# Patient Record
Sex: MALE | Race: WHITE | Employment: FULL TIME | URBAN - METROPOLITAN AREA
[De-identification: names, ages, dates, MRNs, and addresses within clinical notes are randomized per-mention and may not be internally consistent; named-entity substitution may affect disease eponyms.]

---

## 2018-07-26 ENCOUNTER — HOSPITAL ENCOUNTER (EMERGENCY)
Age: 58
Discharge: HOME | End: 2018-07-26
Attending: EMERGENCY MEDICINE
Payer: COMMERCIAL

## 2018-07-26 ENCOUNTER — APPOINTMENT (OUTPATIENT)
Dept: GENERAL RADIOLOGY | Age: 58
End: 2018-07-26
Payer: COMMERCIAL

## 2018-07-26 VITALS
BODY MASS INDEX: 31.14 KG/M2 | HEIGHT: 73 IN | RESPIRATION RATE: 16 BRPM | SYSTOLIC BLOOD PRESSURE: 179 MMHG | DIASTOLIC BLOOD PRESSURE: 106 MMHG | TEMPERATURE: 98.9 F | OXYGEN SATURATION: 98 % | WEIGHT: 235 LBS | HEART RATE: 70 BPM

## 2018-07-26 DIAGNOSIS — S62.521B OPEN FRACTURE OF TUFT OF DISTAL PHALANX OF RIGHT THUMB: Primary | ICD-10-CM

## 2018-07-26 DIAGNOSIS — I10 HYPERTENSION, UNSPECIFIED TYPE: ICD-10-CM

## 2018-07-26 DIAGNOSIS — S61.011A LACERATION OF RIGHT THUMB WITHOUT FOREIGN BODY WITHOUT DAMAGE TO NAIL, INITIAL ENCOUNTER: ICD-10-CM

## 2018-07-26 PROCEDURE — 90471 IMMUNIZATION ADMIN: CPT | Performed by: NURSE PRACTITIONER

## 2018-07-26 PROCEDURE — 73120 X-RAY EXAM OF HAND: CPT

## 2018-07-26 PROCEDURE — 6360000002 HC RX W HCPCS: Performed by: NURSE PRACTITIONER

## 2018-07-26 PROCEDURE — 12001 RPR S/N/AX/GEN/TRNK 2.5CM/<: CPT

## 2018-07-26 PROCEDURE — 6370000000 HC RX 637 (ALT 250 FOR IP): Performed by: NURSE PRACTITIONER

## 2018-07-26 PROCEDURE — 99282 EMERGENCY DEPT VISIT SF MDM: CPT

## 2018-07-26 PROCEDURE — 90715 TDAP VACCINE 7 YRS/> IM: CPT | Performed by: NURSE PRACTITIONER

## 2018-07-26 PROCEDURE — 2500000003 HC RX 250 WO HCPCS: Performed by: NURSE PRACTITIONER

## 2018-07-26 RX ORDER — IBUPROFEN 800 MG/1
800 TABLET ORAL ONCE
Status: COMPLETED | OUTPATIENT
Start: 2018-07-26 | End: 2018-07-26

## 2018-07-26 RX ORDER — HYDROCHLOROTHIAZIDE 12.5 MG/1
12.5 CAPSULE, GELATIN COATED ORAL DAILY
Qty: 14 CAPSULE | Refills: 0 | Status: SHIPPED | OUTPATIENT
Start: 2018-07-26 | End: 2021-06-10

## 2018-07-26 RX ORDER — LIDOCAINE HYDROCHLORIDE 10 MG/ML
5 INJECTION, SOLUTION EPIDURAL; INFILTRATION; INTRACAUDAL; PERINEURAL ONCE
Status: COMPLETED | OUTPATIENT
Start: 2018-07-26 | End: 2018-07-26

## 2018-07-26 RX ORDER — ACETAMINOPHEN AND CODEINE PHOSPHATE 300; 30 MG/1; MG/1
1 TABLET ORAL EVERY 6 HOURS PRN
Qty: 12 TABLET | Refills: 0 | Status: SHIPPED | OUTPATIENT
Start: 2018-07-26 | End: 2018-08-07

## 2018-07-26 RX ORDER — CEPHALEXIN 500 MG/1
500 CAPSULE ORAL 3 TIMES DAILY
Qty: 21 CAPSULE | Refills: 0 | Status: SHIPPED | OUTPATIENT
Start: 2018-07-26 | End: 2018-08-02

## 2018-07-26 RX ADMIN — BACITRACIN ZINC: 500 OINTMENT TOPICAL at 19:36

## 2018-07-26 RX ADMIN — IBUPROFEN 800 MG: 800 TABLET, FILM COATED ORAL at 19:36

## 2018-07-26 RX ADMIN — LIDOCAINE HYDROCHLORIDE 5 ML: 10 INJECTION, SOLUTION EPIDURAL; INFILTRATION; INTRACAUDAL; PERINEURAL at 19:38

## 2018-07-26 RX ADMIN — TETANUS TOXOID, REDUCED DIPHTHERIA TOXOID AND ACELLULAR PERTUSSIS VACCINE, ADSORBED 0.5 ML: 5; 2.5; 8; 8; 2.5 SUSPENSION INTRAMUSCULAR at 19:36

## 2018-07-26 ASSESSMENT — PAIN DESCRIPTION - LOCATION: LOCATION: FINGER (COMMENT WHICH ONE)

## 2018-07-26 ASSESSMENT — PAIN SCALES - GENERAL
PAINLEVEL_OUTOF10: 4
PAINLEVEL_OUTOF10: 5

## 2018-07-26 ASSESSMENT — PAIN DESCRIPTION - PAIN TYPE: TYPE: ACUTE PAIN

## 2018-07-26 ASSESSMENT — PAIN DESCRIPTION - ORIENTATION: ORIENTATION: RIGHT

## 2018-07-26 NOTE — ED PROVIDER NOTES
ED Attending  CC: No       Department of Emergency Medicine   ED  Provider Note  Admit Date/RoomTime: 7/26/2018  5:18 PM  ED Room: 28/33   Chief Complaint   Laceration (rt thumb)    History of Present Illness   Source of history provided by:  patient. History/Exam Limitations: none. Paolo Ayala is a 60 y.o. old male presenting to the emergency department by private vehicle, for Right Thumb pain and laceration which occured 1 hour(s) prior to arrival.  The complaint occurred as a result of Patient states that a piece of metal fell onto his right distal thumb while at work. Previous injury: no.  Since onset the symptoms have been moderate in degree. His pain is aggraveated by movement, use and palpation and relieved by ice and OTC NSAIDS. Tetanus Status: more than 5 years ago. He is right handed. Patient denies any other injury or trauma to his hand or fingers. Patient states that he still has equal sensation to his thumb and all the digits on his right hand. Patient states that he still has full range of motion of his thumb. Patient has a laceration noted to the volar aspect of the distal 1st digit. There is no active bleeding. ROS    Pertinent positives and negatives are stated within HPI, all other systems reviewed and are negative. History reviewed. No pertinent surgical history. Social History:  reports that he has quit smoking. He has never used smokeless tobacco. He reports that he drinks alcohol. Family History: family history is not on file. Allergies: Patient has no known allergies. Physical Exam           ED Triage Vitals [07/26/18 1715]   BP Temp Temp src Pulse Resp SpO2 Height Weight   (!) 181/105 98.9 °F (37.2 °C) -- 87 16 97 % 6' 1\" (1.854 m) 235 lb (106.6 kg)     Oxygen Saturation Interpretation: Normal.    Constitutional:  Alert, development consistent with age. NAD  Neck:  Normal ROM. Supple. Non-tender. No midline or paraspinal tenderness.  No step-offs or

## 2019-08-16 ENCOUNTER — HOSPITAL ENCOUNTER (EMERGENCY)
Age: 59
Discharge: HOME | End: 2019-08-16
Attending: EMERGENCY MEDICINE
Payer: COMMERCIAL

## 2019-08-16 ENCOUNTER — APPOINTMENT (OUTPATIENT)
Dept: GENERAL RADIOLOGY | Age: 59
End: 2019-08-16
Payer: COMMERCIAL

## 2019-08-16 VITALS
SYSTOLIC BLOOD PRESSURE: 162 MMHG | HEIGHT: 73 IN | TEMPERATURE: 97.4 F | DIASTOLIC BLOOD PRESSURE: 89 MMHG | BODY MASS INDEX: 32.47 KG/M2 | WEIGHT: 245 LBS | OXYGEN SATURATION: 98 % | HEART RATE: 82 BPM | RESPIRATION RATE: 18 BRPM

## 2019-08-16 DIAGNOSIS — S68.119A TRAUMATIC AMPUTATION OF FINGER, INITIAL ENCOUNTER: Primary | ICD-10-CM

## 2019-08-16 PROCEDURE — 99284 EMERGENCY DEPT VISIT MOD MDM: CPT

## 2019-08-16 PROCEDURE — 96365 THER/PROPH/DIAG IV INF INIT: CPT

## 2019-08-16 PROCEDURE — 90715 TDAP VACCINE 7 YRS/> IM: CPT | Performed by: EMERGENCY MEDICINE

## 2019-08-16 PROCEDURE — 6360000002 HC RX W HCPCS: Performed by: EMERGENCY MEDICINE

## 2019-08-16 PROCEDURE — 90471 IMMUNIZATION ADMIN: CPT | Performed by: EMERGENCY MEDICINE

## 2019-08-16 PROCEDURE — 73130 X-RAY EXAM OF HAND: CPT

## 2019-08-16 RX ORDER — FENTANYL CITRATE 50 UG/ML
100 INJECTION, SOLUTION INTRAMUSCULAR; INTRAVENOUS ONCE
Status: COMPLETED | OUTPATIENT
Start: 2019-08-16 | End: 2019-08-16

## 2019-08-16 RX ORDER — CEFAZOLIN SODIUM 1 G/50ML
1 SOLUTION INTRAVENOUS ONCE
Status: COMPLETED | OUTPATIENT
Start: 2019-08-16 | End: 2019-08-16

## 2019-08-16 RX ORDER — LIDOCAINE HYDROCHLORIDE 10 MG/ML
20 INJECTION, SOLUTION EPIDURAL; INFILTRATION; INTRACAUDAL; PERINEURAL SEE ADMIN INSTRUCTIONS
Status: DISCONTINUED | OUTPATIENT
Start: 2019-08-16 | End: 2019-08-16 | Stop reason: HOSPADM

## 2019-08-16 RX ORDER — CEPHALEXIN 500 MG/1
500 CAPSULE ORAL 3 TIMES DAILY
Qty: 30 CAPSULE | Refills: 0 | Status: SHIPPED | OUTPATIENT
Start: 2019-08-16 | End: 2019-08-26

## 2019-08-16 RX ORDER — HYDROCODONE BITARTRATE AND ACETAMINOPHEN 5; 325 MG/1; MG/1
1 TABLET ORAL EVERY 6 HOURS PRN
Qty: 6 TABLET | Refills: 0 | Status: SHIPPED | OUTPATIENT
Start: 2019-08-16 | End: 2019-08-19

## 2019-08-16 RX ADMIN — CEFAZOLIN SODIUM 1 G: 1 SOLUTION INTRAVENOUS at 12:37

## 2019-08-16 RX ADMIN — FENTANYL CITRATE 100 MCG: 50 INJECTION, SOLUTION INTRAMUSCULAR; INTRAVENOUS at 09:15

## 2019-08-16 RX ADMIN — TETANUS TOXOID, REDUCED DIPHTHERIA TOXOID AND ACELLULAR PERTUSSIS VACCINE, ADSORBED 0.5 ML: 5; 2.5; 8; 8; 2.5 SUSPENSION INTRAMUSCULAR at 09:17

## 2019-08-16 ASSESSMENT — PAIN DESCRIPTION - FREQUENCY: FREQUENCY: CONTINUOUS

## 2019-08-16 ASSESSMENT — PAIN DESCRIPTION - LOCATION: LOCATION: FINGER (COMMENT WHICH ONE)

## 2019-08-16 ASSESSMENT — PAIN SCALES - GENERAL
PAINLEVEL_OUTOF10: 9
PAINLEVEL_OUTOF10: 10

## 2019-08-16 ASSESSMENT — PAIN DESCRIPTION - PAIN TYPE: TYPE: ACUTE PAIN

## 2019-08-16 ASSESSMENT — PAIN DESCRIPTION - PROGRESSION: CLINICAL_PROGRESSION: GRADUALLY IMPROVING

## 2019-08-16 ASSESSMENT — PAIN DESCRIPTION - ORIENTATION: ORIENTATION: LEFT

## 2019-08-16 NOTE — ED PROVIDER NOTES
79-year-old male presenting from a crush injury from work. He states that he is left index finger got stuck in between a machine that holds a tailgate closed for a dump truck. Bleeding is controlled, brisk radial pulse intact, the distal portion of his left finger is absent. Review of Systems   Skin: Positive for wound. All other systems reviewed and are negative. Physical Exam   Constitutional: He is oriented to person, place, and time. He appears well-developed and well-nourished. No distress. HENT:   Head: Normocephalic and atraumatic. Eyes: Pupils are equal, round, and reactive to light. Neck: Normal range of motion. Neck supple. No thyromegaly present. Cardiovascular: Normal rate and regular rhythm. Pulmonary/Chest: Effort normal and breath sounds normal. No respiratory distress. He has no wheezes. Abdominal: Soft. He exhibits no distension and no mass. There is no tenderness. There is no rebound and no guarding. Musculoskeletal: He exhibits tenderness and deformity. He exhibits no edema. Hands:  Amputation of the index finger on the left hand distal to the PIP   Neurological: He is alert and oriented to person, place, and time. No cranial nerve deficit. Skin: Skin is warm and dry. No erythema. Psychiatric: He has a normal mood and affect. Procedures    [facility]              --------------------------------------------- PAST HISTORY ---------------------------------------------  Past Medical History:  has no past medical history on file. Past Surgical History:  has no past surgical history on file. Social History:  reports that he has quit smoking. He has never used smokeless tobacco. He reports that he drinks alcohol. Family History: family history is not on file. The patients home medications have been reviewed. Allergies: Patient has no known allergies.     -------------------------------------------------- RESULTS -------------------------------------------------  Labs:  No results found for this visit on 08/16/19. Radiology:  XR HAND LEFT (MIN 3 VIEWS)   Final Result   And rotation of the second digit distal phalanx at its   most proximal aspect. Soft tissue laceration noted along the volar   aspect of the remaining second digit.             ------------------------- NURSING NOTES AND VITALS REVIEWED ---------------------------  Date / Time Roomed:  8/16/2019  8:53 AM  ED Bed Assignment:  19/19    The nursing notes within the ED encounter and vital signs as below have been reviewed. BP (!) 162/89   Pulse 82   Temp 97.4 °F (36.3 °C) (Temporal)   Resp 18   Ht 6' 1\" (1.854 m)   Wt 245 lb (111.1 kg)   SpO2 98%   BMI 32.32 kg/m²   Oxygen Saturation Interpretation: Normal      ------------------------------------------ PROGRESS NOTES ------------------------------------------  I have spoken with the patient and discussed todays results, in addition to providing specific details for the plan of care and counseling regarding the diagnosis and prognosis. Their questions are answered at this time and they are agreeable with the plan. I discussed at length with them reasons for immediate return here for re evaluation. They will followup with primary care by calling their office tomorrow. Orthopedic surgery consulted who presented emergency department to do a revision of the trauma I spoke with the orthopedic surgeon as well. Patient will be able to be discharged home and will follow up with the orthopedic surgeon. --------------------------------- ADDITIONAL PROVIDER NOTES ---------------------------------  At this time the patient is without objective evidence of an acute process requiring hospitalization or inpatient management. They have remained hemodynamically stable throughout their entire ED visit and are stable for discharge with outpatient follow-up.      The plan has been discussed in detail and they

## 2019-08-16 NOTE — CONSULTS
Department of Orthopedic Surgery  Resident Consult Note          Reason for Consult:  Left hand pain     HISTORY OF PRESENT ILLNESS:       Patient is a 60 y.o. male who presents with left hand pain after a dump truck lift crushed his index finger. Immediately afterwards, patient states he noticed a deformity and had severe pain. Minimal bleeding per patient. Patient denies any anticoagulation. Denies any other injuries. Patient is missing the distal portion of the of the index finger with bone exposed. Patient denies any orthopedic complaints at this time. Past Medical History:    History reviewed. No pertinent past medical history. Past Surgical History:    History reviewed. No pertinent surgical history. Current Medications:   Current Facility-Administered Medications: lidocaine PF 1 % injection 20 mL, 20 mL, Intra-articular, See Admin Instructions  ceFAZolin (ANCEF) 1 g in dextrose 4 % 50 mL IVPB (duplex, 1 g, Intravenous, Once  Allergies:  Patient has no known allergies. Social History:   TOBACCO:   reports that he has quit smoking. He has never used smokeless tobacco.  ETOH:   reports that he drinks alcohol. DRUGS:   has no drug history on file. ACTIVITIES OF DAILY LIVING:    OCCUPATION:    Family History:   History reviewed. No pertinent family history.     REVIEW OF SYSTEMS:  CONSTITUTIONAL:  negative for  fevers, chills  HEENT:  negative for  cough  RESPIRATORY:  negative for wheezing  CARDIOVASCULAR:  negative for  chest pain  GASTROINTESTINAL:  negative for nausea, vomiting  HEMATOLOGIC/LYMPHATIC:  Positive for bleeding  MUSCULOSKELETAL:  positive for  pain  NEUROLOGICAL:  negative for headaches, dizziness  BEHAVIOR/PSYCH:  negative for increased agitation and anxiety    PHYSICAL EXAM:    VITALS:  /78   Pulse 67   Temp 97.4 °F (36.3 °C) (Temporal)   Resp 17   Ht 6' 1\" (1.854 m)   Wt 245 lb (111.1 kg)   SpO2 96%   BMI 32.32 kg/m²   CONSTITUTIONAL:  awake, alert, cooperative, no

## 2019-08-21 ENCOUNTER — TELEPHONE (OUTPATIENT)
Dept: ADMINISTRATIVE | Age: 59
End: 2019-08-21

## 2019-08-21 ENCOUNTER — TELEPHONE (OUTPATIENT)
Dept: ORTHOPEDIC SURGERY | Age: 59
End: 2019-08-21

## 2019-08-22 NOTE — TELEPHONE ENCOUNTER
Patient returned call and stated DEDE gave patient an appointment for 8/23/19 and he is going to stay with that appointment.

## 2021-06-10 ENCOUNTER — APPOINTMENT (OUTPATIENT)
Dept: CT IMAGING | Age: 61
DRG: 175 | End: 2021-06-10

## 2021-06-10 ENCOUNTER — APPOINTMENT (OUTPATIENT)
Dept: GENERAL RADIOLOGY | Age: 61
DRG: 175 | End: 2021-06-10

## 2021-06-10 ENCOUNTER — HOSPITAL ENCOUNTER (INPATIENT)
Age: 61
LOS: 7 days | Discharge: HOME | DRG: 175 | End: 2021-06-17
Attending: EMERGENCY MEDICINE | Admitting: INTERNAL MEDICINE

## 2021-06-10 DIAGNOSIS — R09.02 HYPOXIA: ICD-10-CM

## 2021-06-10 DIAGNOSIS — J18.9 PNEUMONIA OF RIGHT LOWER LOBE DUE TO INFECTIOUS ORGANISM: ICD-10-CM

## 2021-06-10 DIAGNOSIS — I26.99 ACUTE PULMONARY EMBOLISM WITHOUT ACUTE COR PULMONALE, UNSPECIFIED PULMONARY EMBOLISM TYPE (HCC): Primary | ICD-10-CM

## 2021-06-10 LAB
ADENOVIRUS BY PCR: NOT DETECTED
ALBUMIN SERPL-MCNC: 3.7 G/DL (ref 3.5–5.2)
ALP BLD-CCNC: 88 U/L (ref 40–129)
ALT SERPL-CCNC: 14 U/L (ref 0–40)
ANION GAP SERPL CALCULATED.3IONS-SCNC: 11 MMOL/L (ref 7–16)
APTT: 102.1 SEC (ref 24.5–35.1)
APTT: 26.7 SEC (ref 24.5–35.1)
AST SERPL-CCNC: 14 U/L (ref 0–39)
BACTERIA: ABNORMAL /HPF
BASOPHILS ABSOLUTE: 0 E9/L (ref 0–0.2)
BASOPHILS RELATIVE PERCENT: 0.2 % (ref 0–2)
BILIRUB SERPL-MCNC: 1.2 MG/DL (ref 0–1.2)
BILIRUBIN URINE: NEGATIVE
BLOOD, URINE: ABNORMAL
BORDETELLA PARAPERTUSSIS BY PCR: NOT DETECTED
BORDETELLA PERTUSSIS BY PCR: NOT DETECTED
BUN BLDV-MCNC: 13 MG/DL (ref 6–23)
CALCIUM SERPL-MCNC: 9.5 MG/DL (ref 8.6–10.2)
CHLAMYDOPHILIA PNEUMONIAE BY PCR: NOT DETECTED
CHLORIDE BLD-SCNC: 97 MMOL/L (ref 98–107)
CLARITY: CLEAR
CO2: 26 MMOL/L (ref 22–29)
COLOR: ABNORMAL
CORONAVIRUS 229E BY PCR: NOT DETECTED
CORONAVIRUS HKU1 BY PCR: NOT DETECTED
CORONAVIRUS NL63 BY PCR: NOT DETECTED
CORONAVIRUS OC43 BY PCR: NOT DETECTED
CREAT SERPL-MCNC: 1.2 MG/DL (ref 0.7–1.2)
EKG ATRIAL RATE: 118 BPM
EKG P AXIS: 51 DEGREES
EKG P-R INTERVAL: 160 MS
EKG Q-T INTERVAL: 332 MS
EKG QRS DURATION: 100 MS
EKG QTC CALCULATION (BAZETT): 465 MS
EKG R AXIS: -29 DEGREES
EKG T AXIS: 29 DEGREES
EKG VENTRICULAR RATE: 118 BPM
EOSINOPHILS ABSOLUTE: 0 E9/L (ref 0.05–0.5)
EOSINOPHILS RELATIVE PERCENT: 0 % (ref 0–6)
GFR AFRICAN AMERICAN: >60
GFR NON-AFRICAN AMERICAN: >60 ML/MIN/1.73
GLUCOSE BLD-MCNC: 133 MG/DL (ref 74–99)
GLUCOSE URINE: NEGATIVE MG/DL
HCT VFR BLD CALC: 45.5 % (ref 37–54)
HCT VFR BLD CALC: 50 % (ref 37–54)
HEMOGLOBIN: 15.6 G/DL (ref 12.5–16.5)
HEMOGLOBIN: 16.7 G/DL (ref 12.5–16.5)
HOMOCYSTEINE: 9.8 UMOL/L (ref 0–15)
HUMAN METAPNEUMOVIRUS BY PCR: NOT DETECTED
HUMAN RHINOVIRUS/ENTEROVIRUS BY PCR: NOT DETECTED
INFLUENZA A BY PCR: NOT DETECTED
INFLUENZA B BY PCR: NOT DETECTED
INR BLD: 1.3
KETONES, URINE: NEGATIVE MG/DL
LACTIC ACID: 1.5 MMOL/L (ref 0.5–2.2)
LEUKOCYTE ESTERASE, URINE: NEGATIVE
LYMPHOCYTES ABSOLUTE: 0.82 E9/L (ref 1.5–4)
LYMPHOCYTES RELATIVE PERCENT: 3.5 % (ref 20–42)
MCH RBC QN AUTO: 31.9 PG (ref 26–35)
MCH RBC QN AUTO: 32.1 PG (ref 26–35)
MCHC RBC AUTO-ENTMCNC: 33.4 % (ref 32–34.5)
MCHC RBC AUTO-ENTMCNC: 34.3 % (ref 32–34.5)
MCV RBC AUTO: 93.6 FL (ref 80–99.9)
MCV RBC AUTO: 95.6 FL (ref 80–99.9)
MONOCYTES ABSOLUTE: 1.02 E9/L (ref 0.1–0.95)
MONOCYTES RELATIVE PERCENT: 5.3 % (ref 2–12)
MYCOPLASMA PNEUMONIAE BY PCR: NOT DETECTED
NEUTROPHILS ABSOLUTE: 18.56 E9/L (ref 1.8–7.3)
NEUTROPHILS RELATIVE PERCENT: 91.2 % (ref 43–80)
NITRITE, URINE: NEGATIVE
PARAINFLUENZA VIRUS 1 BY PCR: NOT DETECTED
PARAINFLUENZA VIRUS 2 BY PCR: NOT DETECTED
PARAINFLUENZA VIRUS 3 BY PCR: NOT DETECTED
PARAINFLUENZA VIRUS 4 BY PCR: NOT DETECTED
PDW BLD-RTO: 11.9 FL (ref 11.5–15)
PDW BLD-RTO: 12 FL (ref 11.5–15)
PH UA: 5.5 (ref 5–9)
PLATELET # BLD: 262 E9/L (ref 130–450)
PLATELET # BLD: 272 E9/L (ref 130–450)
PMV BLD AUTO: 9.4 FL (ref 7–12)
PMV BLD AUTO: 9.6 FL (ref 7–12)
POTASSIUM REFLEX MAGNESIUM: 4.5 MMOL/L (ref 3.5–5)
PRO-BNP: 1317 PG/ML (ref 0–125)
PROCALCITONIN: 0.2 NG/ML (ref 0–0.08)
PROCALCITONIN: 0.33 NG/ML (ref 0–0.08)
PROTEIN UA: 30 MG/DL
PROTHROMBIN TIME: 14.8 SEC (ref 9.3–12.4)
RBC # BLD: 4.86 E12/L (ref 3.8–5.8)
RBC # BLD: 5.23 E12/L (ref 3.8–5.8)
RBC # BLD: NORMAL 10*6/UL
RBC UA: ABNORMAL /HPF (ref 0–2)
RESPIRATORY SYNCYTIAL VIRUS BY PCR: NOT DETECTED
SARS-COV-2, NAAT: NOT DETECTED
SARS-COV-2, PCR: NOT DETECTED
SODIUM BLD-SCNC: 134 MMOL/L (ref 132–146)
SPECIFIC GRAVITY UA: 1.02 (ref 1–1.03)
TOTAL PROTEIN: 8 G/DL (ref 6.4–8.3)
TROPONIN, HIGH SENSITIVITY: 38 NG/L (ref 0–11)
TROPONIN, HIGH SENSITIVITY: 38 NG/L (ref 0–11)
UROBILINOGEN, URINE: 1 E.U./DL
WBC # BLD: 20.4 E9/L (ref 4.5–11.5)
WBC # BLD: 23.3 E9/L (ref 4.5–11.5)
WBC UA: ABNORMAL /HPF (ref 0–5)

## 2021-06-10 PROCEDURE — 87635 SARS-COV-2 COVID-19 AMP PRB: CPT

## 2021-06-10 PROCEDURE — 85730 THROMBOPLASTIN TIME PARTIAL: CPT

## 2021-06-10 PROCEDURE — 85300 ANTITHROMBIN III ACTIVITY: CPT

## 2021-06-10 PROCEDURE — 85303 CLOT INHIBIT PROT C ACTIVITY: CPT

## 2021-06-10 PROCEDURE — 96374 THER/PROPH/DIAG INJ IV PUSH: CPT

## 2021-06-10 PROCEDURE — 2580000003 HC RX 258: Performed by: INTERNAL MEDICINE

## 2021-06-10 PROCEDURE — 71046 X-RAY EXAM CHEST 2 VIEWS: CPT

## 2021-06-10 PROCEDURE — 85306 CLOT INHIBIT PROT S FREE: CPT

## 2021-06-10 PROCEDURE — 84484 ASSAY OF TROPONIN QUANT: CPT

## 2021-06-10 PROCEDURE — 85025 COMPLETE CBC W/AUTO DIFF WBC: CPT

## 2021-06-10 PROCEDURE — 83090 ASSAY OF HOMOCYSTEINE: CPT

## 2021-06-10 PROCEDURE — 87040 BLOOD CULTURE FOR BACTERIA: CPT

## 2021-06-10 PROCEDURE — 2500000003 HC RX 250 WO HCPCS: Performed by: EMERGENCY MEDICINE

## 2021-06-10 PROCEDURE — 2060000000 HC ICU INTERMEDIATE R&B

## 2021-06-10 PROCEDURE — 2580000003 HC RX 258: Performed by: STUDENT IN AN ORGANIZED HEALTH CARE EDUCATION/TRAINING PROGRAM

## 2021-06-10 PROCEDURE — 36415 COLL VENOUS BLD VENIPUNCTURE: CPT

## 2021-06-10 PROCEDURE — 85613 RUSSELL VIPER VENOM DILUTED: CPT

## 2021-06-10 PROCEDURE — 80053 COMPREHEN METABOLIC PANEL: CPT

## 2021-06-10 PROCEDURE — 2700000000 HC OXYGEN THERAPY PER DAY

## 2021-06-10 PROCEDURE — 99285 EMERGENCY DEPT VISIT HI MDM: CPT

## 2021-06-10 PROCEDURE — 85027 COMPLETE CBC AUTOMATED: CPT

## 2021-06-10 PROCEDURE — 85610 PROTHROMBIN TIME: CPT

## 2021-06-10 PROCEDURE — 93010 ELECTROCARDIOGRAM REPORT: CPT | Performed by: INTERNAL MEDICINE

## 2021-06-10 PROCEDURE — 81400 MOPATH PROCEDURE LEVEL 1: CPT

## 2021-06-10 PROCEDURE — 84145 PROCALCITONIN (PCT): CPT

## 2021-06-10 PROCEDURE — 71275 CT ANGIOGRAPHY CHEST: CPT

## 2021-06-10 PROCEDURE — 93005 ELECTROCARDIOGRAM TRACING: CPT | Performed by: PHYSICIAN ASSISTANT

## 2021-06-10 PROCEDURE — 81241 F5 GENE: CPT

## 2021-06-10 PROCEDURE — 2580000003 HC RX 258: Performed by: EMERGENCY MEDICINE

## 2021-06-10 PROCEDURE — 6360000004 HC RX CONTRAST MEDICATION: Performed by: STUDENT IN AN ORGANIZED HEALTH CARE EDUCATION/TRAINING PROGRAM

## 2021-06-10 PROCEDURE — 81240 F2 GENE: CPT

## 2021-06-10 PROCEDURE — 6360000002 HC RX W HCPCS: Performed by: EMERGENCY MEDICINE

## 2021-06-10 PROCEDURE — 83605 ASSAY OF LACTIC ACID: CPT

## 2021-06-10 PROCEDURE — 74176 CT ABD & PELVIS W/O CONTRAST: CPT

## 2021-06-10 PROCEDURE — 81291 MTHFR GENE: CPT

## 2021-06-10 PROCEDURE — 0202U NFCT DS 22 TRGT SARS-COV-2: CPT

## 2021-06-10 PROCEDURE — 83880 ASSAY OF NATRIURETIC PEPTIDE: CPT

## 2021-06-10 PROCEDURE — 81001 URINALYSIS AUTO W/SCOPE: CPT

## 2021-06-10 RX ORDER — SODIUM CHLORIDE 0.9 % (FLUSH) 0.9 %
5-40 SYRINGE (ML) INJECTION EVERY 12 HOURS SCHEDULED
Status: DISCONTINUED | OUTPATIENT
Start: 2021-06-10 | End: 2021-06-17 | Stop reason: HOSPADM

## 2021-06-10 RX ORDER — FENTANYL CITRATE 50 UG/ML
100 INJECTION, SOLUTION INTRAMUSCULAR; INTRAVENOUS ONCE
Status: COMPLETED | OUTPATIENT
Start: 2021-06-10 | End: 2021-06-10

## 2021-06-10 RX ORDER — ACETAMINOPHEN 325 MG/1
650 TABLET ORAL EVERY 6 HOURS PRN
Status: DISCONTINUED | OUTPATIENT
Start: 2021-06-10 | End: 2021-06-17 | Stop reason: HOSPADM

## 2021-06-10 RX ORDER — HEPARIN SODIUM 1000 [USP'U]/ML
80 INJECTION, SOLUTION INTRAVENOUS; SUBCUTANEOUS PRN
Status: DISCONTINUED | OUTPATIENT
Start: 2021-06-10 | End: 2021-06-17

## 2021-06-10 RX ORDER — SODIUM CHLORIDE 0.9 % (FLUSH) 0.9 %
5-40 SYRINGE (ML) INJECTION PRN
Status: DISCONTINUED | OUTPATIENT
Start: 2021-06-10 | End: 2021-06-17 | Stop reason: HOSPADM

## 2021-06-10 RX ORDER — POLYETHYLENE GLYCOL 3350 17 G/17G
17 POWDER, FOR SOLUTION ORAL DAILY PRN
Status: DISCONTINUED | OUTPATIENT
Start: 2021-06-10 | End: 2021-06-17 | Stop reason: HOSPADM

## 2021-06-10 RX ORDER — SODIUM CHLORIDE 9 MG/ML
25 INJECTION, SOLUTION INTRAVENOUS PRN
Status: DISCONTINUED | OUTPATIENT
Start: 2021-06-10 | End: 2021-06-17 | Stop reason: HOSPADM

## 2021-06-10 RX ORDER — HYDRALAZINE HYDROCHLORIDE 20 MG/ML
5 INJECTION INTRAMUSCULAR; INTRAVENOUS EVERY 8 HOURS PRN
Status: DISCONTINUED | OUTPATIENT
Start: 2021-06-10 | End: 2021-06-10

## 2021-06-10 RX ORDER — ONDANSETRON 4 MG/1
4 TABLET, ORALLY DISINTEGRATING ORAL EVERY 8 HOURS PRN
Status: DISCONTINUED | OUTPATIENT
Start: 2021-06-10 | End: 2021-06-17 | Stop reason: HOSPADM

## 2021-06-10 RX ORDER — SODIUM CHLORIDE 0.9 % (FLUSH) 0.9 %
10 SYRINGE (ML) INJECTION
Status: COMPLETED | OUTPATIENT
Start: 2021-06-10 | End: 2021-06-10

## 2021-06-10 RX ORDER — ONDANSETRON 2 MG/ML
4 INJECTION INTRAMUSCULAR; INTRAVENOUS EVERY 6 HOURS PRN
Status: DISCONTINUED | OUTPATIENT
Start: 2021-06-10 | End: 2021-06-17 | Stop reason: HOSPADM

## 2021-06-10 RX ORDER — HEPARIN SODIUM 1000 [USP'U]/ML
40 INJECTION, SOLUTION INTRAVENOUS; SUBCUTANEOUS PRN
Status: DISCONTINUED | OUTPATIENT
Start: 2021-06-10 | End: 2021-06-17

## 2021-06-10 RX ORDER — ACETAMINOPHEN 650 MG/1
650 SUPPOSITORY RECTAL EVERY 6 HOURS PRN
Status: DISCONTINUED | OUTPATIENT
Start: 2021-06-10 | End: 2021-06-17 | Stop reason: HOSPADM

## 2021-06-10 RX ORDER — METOPROLOL TARTRATE 5 MG/5ML
5 INJECTION INTRAVENOUS EVERY 6 HOURS PRN
Status: DISCONTINUED | OUTPATIENT
Start: 2021-06-10 | End: 2021-06-17 | Stop reason: HOSPADM

## 2021-06-10 RX ORDER — HEPARIN SODIUM 1000 [USP'U]/ML
80 INJECTION, SOLUTION INTRAVENOUS; SUBCUTANEOUS ONCE
Status: COMPLETED | OUTPATIENT
Start: 2021-06-10 | End: 2021-06-10

## 2021-06-10 RX ORDER — HEPARIN SODIUM 10000 [USP'U]/100ML
18 INJECTION, SOLUTION INTRAVENOUS CONTINUOUS
Status: DISCONTINUED | OUTPATIENT
Start: 2021-06-10 | End: 2021-06-17

## 2021-06-10 RX ADMIN — Medication 10 ML: at 14:24

## 2021-06-10 RX ADMIN — CEFTRIAXONE 1000 MG: 1 INJECTION, POWDER, FOR SOLUTION INTRAMUSCULAR; INTRAVENOUS at 16:10

## 2021-06-10 RX ADMIN — IOPAMIDOL 70 ML: 755 INJECTION, SOLUTION INTRAVENOUS at 14:24

## 2021-06-10 RX ADMIN — HEPARIN SODIUM 8530 UNITS: 1000 INJECTION INTRAVENOUS; SUBCUTANEOUS at 16:20

## 2021-06-10 RX ADMIN — DOXYCYCLINE 100 MG: 100 INJECTION, POWDER, LYOPHILIZED, FOR SOLUTION INTRAVENOUS at 16:17

## 2021-06-10 RX ADMIN — FENTANYL CITRATE 100 MCG: 50 INJECTION, SOLUTION INTRAMUSCULAR; INTRAVENOUS at 12:55

## 2021-06-10 RX ADMIN — Medication 10 ML: at 21:20

## 2021-06-10 RX ADMIN — HEPARIN SODIUM 18 UNITS/KG/HR: 10000 INJECTION, SOLUTION INTRAVENOUS at 16:27

## 2021-06-10 ASSESSMENT — PAIN DESCRIPTION - LOCATION: LOCATION: CHEST

## 2021-06-10 ASSESSMENT — PAIN SCALES - GENERAL
PAINLEVEL_OUTOF10: 10
PAINLEVEL_OUTOF10: 10

## 2021-06-10 ASSESSMENT — PAIN DESCRIPTION - FREQUENCY: FREQUENCY: CONTINUOUS

## 2021-06-10 ASSESSMENT — PAIN DESCRIPTION - PAIN TYPE: TYPE: ACUTE PAIN

## 2021-06-10 ASSESSMENT — PAIN DESCRIPTION - ORIENTATION: ORIENTATION: RIGHT

## 2021-06-10 NOTE — ED PROVIDER NOTES
[facility]  Department of Emergency Medicine   ED  Encounter Note  Admit Date/RoomTime: 6/10/2021 11:25 AM  ED Room: 33/33    NAME: Shilpi Benitez  : 1960  MRN: [id number]     Chief Complaint:  Shortness of Breath (since mowing lawn on Monday, right chest pain) and Hematuria    History of Present Illness      Shilpi Benitez is a 57 y.o. old male who presents to the emergency department for evaluation of shortness of breath and right-sided chest pain. Symptoms began 4 days ago while he was mowing the lawn. He denies any prior cardiac history. No history of COPD or asthma. He quit smoking many years ago. He denies history of PE or DVT. No recent travel or prolonged immobilization. He has had no falls or trauma. Additionally, he noticed there was some blood in his urine. No prior history of kidney stones. He is not anticoagulated. .  ROS   Pertinent positives and negatives are stated within HPI, all other systems reviewed and are negative. Past Medical History:  has a past medical history of Hypertension. Surgical History:  has no past surgical history on file. Social History:  reports that he has quit smoking. He has never used smokeless tobacco. He reports current alcohol use. Family History: family history is not on file. Allergies: Patient has no known allergies. Physical Exam   Oxygen Saturation Interpretation: Abnormal and Improved after treatment. ED Triage Vitals   BP Temp Temp src Pulse Resp SpO2 Height Weight   06/10/21 1055 06/10/21 1032 -- 06/10/21 1032 06/10/21 1055 06/10/21 1032 -- 06/10/21 1055   (!) 137/105 98 °F (36.7 °C)  119 20 92 %  235 lb (106.6 kg)         · General Appearance/Constitutional:  Alert  · HEENT:  NC/NT. PERRLA. Airway patent. · Neck:  Normal ROM. Supple. · Respiratoty:  Breath sounds: equal bilaterally. Lung sounds: diminished breath sounds- right base and left base.    · CV:  Regular rhythm, Not Detected   CBC Auto Differential   Result Value Ref Range    WBC 20.4 (H) 4.5 - 11.5 E9/L    RBC 5.23 3.80 - 5.80 E12/L    Hemoglobin 16.7 (H) 12.5 - 16.5 g/dL    Hematocrit 50.0 37.0 - 54.0 %    MCV 95.6 80.0 - 99.9 fL    MCH 31.9 26.0 - 35.0 pg    MCHC 33.4 32.0 - 34.5 %    RDW 11.9 11.5 - 15.0 fL    Platelets 821 501 - 963 E9/L    MPV 9.4 7.0 - 12.0 fL    Neutrophils % 91.2 (H) 43.0 - 80.0 %    Lymphocytes % 3.5 (L) 20.0 - 42.0 %    Monocytes % 5.3 2.0 - 12.0 %    Eosinophils % 0.0 0.0 - 6.0 %    Basophils % 0.2 0.0 - 2.0 %    Neutrophils Absolute 18.56 (H) 1.80 - 7.30 E9/L    Lymphocytes Absolute 0.82 (L) 1.50 - 4.00 E9/L    Monocytes Absolute 1.02 (H) 0.10 - 0.95 E9/L    Eosinophils Absolute 0.00 (L) 0.05 - 0.50 E9/L    Basophils Absolute 0.00 0.00 - 0.20 E9/L    RBC Morphology Normal    Comprehensive Metabolic Panel w/ Reflex to MG   Result Value Ref Range    Sodium 134 132 - 146 mmol/L    Potassium reflex Magnesium 4.5 3.5 - 5.0 mmol/L    Chloride 97 (L) 98 - 107 mmol/L    CO2 26 22 - 29 mmol/L    Anion Gap 11 7 - 16 mmol/L    Glucose 133 (H) 74 - 99 mg/dL    BUN 13 6 - 23 mg/dL    CREATININE 1.2 0.7 - 1.2 mg/dL    GFR Non-African American >60 >=60 mL/min/1.73    GFR African American >60     Calcium 9.5 8.6 - 10.2 mg/dL    Total Protein 8.0 6.4 - 8.3 g/dL    Albumin 3.7 3.5 - 5.2 g/dL    Total Bilirubin 1.2 0.0 - 1.2 mg/dL    Alkaline Phosphatase 88 40 - 129 U/L    ALT 14 0 - 40 U/L    AST 14 0 - 39 U/L   Brain Natriuretic Peptide   Result Value Ref Range    Pro-BNP 1,317 (H) 0 - 125 pg/mL   Troponin   Result Value Ref Range    Troponin, High Sensitivity 38 (H) 0 - 11 ng/L   Urinalysis, reflex to microscopic   Result Value Ref Range    Color, UA DARK YELLOW (A) Straw/Yellow    Clarity, UA Clear Clear    Glucose, Ur Negative Negative mg/dL    Bilirubin Urine Negative Negative    Ketones, Urine Negative Negative mg/dL    Specific Gravity, UA 1.020 1.005 - 1.030    Blood, Urine TRACE-INTACT Negative    pH, UA 5.5 5.0 - 9.0    Protein, UA 30 (A) Negative mg/dL    Urobilinogen, Urine 1.0 <2.0 E.U./dL    Nitrite, Urine Negative Negative    Leukocyte Esterase, Urine Negative Negative   Protime-INR   Result Value Ref Range    Protime 14.8 (H) 9.3 - 12.4 sec    INR 1.3    APTT   Result Value Ref Range    aPTT 26.7 24.5 - 35.1 sec   Procalcitonin   Result Value Ref Range    Procalcitonin 0.20 (H) 0.00 - 0.08 ng/mL   Lactic Acid, Plasma   Result Value Ref Range    Lactic Acid 1.5 0.5 - 2.2 mmol/L   Microscopic Urinalysis   Result Value Ref Range    WBC, UA NONE 0 - 5 /HPF    RBC, UA 0-1 0 - 2 /HPF    Bacteria, UA RARE (A) None Seen /HPF   EKG 12 Lead   Result Value Ref Range    Ventricular Rate 118 BPM    Atrial Rate 118 BPM    P-R Interval 160 ms    QRS Duration 100 ms    Q-T Interval 332 ms    QTc Calculation (Bazett) 465 ms    P Axis 51 degrees    R Axis -29 degrees    T Axis 29 degrees     Imaging: All Radiology results interpreted by Radiologist unless otherwise noted. CTA CHEST W CONTRAST   Final Result   CTA OF THE CHEST:      1. Prominent bilateral pulmonary emboli, worse in the lower lobes. 2. Pulmonary infiltrate in the right lower lobe may represent an   infectious/inflammatory process or infarction. 3. Small right pleural effusion. CT OF THE ABDOMEN AND PELVIS:      1.  No acute abnormality is seen in the abdomen or the pelvis. 2. 4.2 x 3.3 cm mass medial to the spleen may represent a splenule. Other   etiologies cannot be excluded. Further characterization with multiphasic   contrast enhanced MRI of the abdomen is recommended (to determine whether it   enhances in the pattern similar to that of the spleen). 3. No urolithiasis or hydronephrosis. Small fat containing right inguinal   hernia. No evidence of fat strangulation. CT ABDOMEN PELVIS WO CONTRAST Additional Contrast? None   Final Result   CTA OF THE CHEST:      1. Prominent bilateral pulmonary emboli, worse in the lower lobes.    2. Pulmonary infiltrate in the right lower lobe may represent an   infectious/inflammatory process or infarction. 3. Small right pleural effusion. CT OF THE ABDOMEN AND PELVIS:      1.  No acute abnormality is seen in the abdomen or the pelvis. 2. 4.2 x 3.3 cm mass medial to the spleen may represent a splenule. Other   etiologies cannot be excluded. Further characterization with multiphasic   contrast enhanced MRI of the abdomen is recommended (to determine whether it   enhances in the pattern similar to that of the spleen). 3. No urolithiasis or hydronephrosis. Small fat containing right inguinal   hernia. No evidence of fat strangulation. XR CHEST (2 VW)   Final Result   Right lower lobe airspace disease favored to represent pneumonia. EKG #1:  Interpreted by emergency department physician unless otherwise noted. Time:  11:13    Rate: 118 bpm  Rhythm: Sinus Tachycardia. Interpretation: Sinus tachycardia. Comparison: There are no previous tracings available for comparison.     ED Course / Medical Decision Making     Medications   heparin (porcine) injection 8,530 Units (has no administration in time range)   heparin (porcine) injection 4,260 Units (has no administration in time range)   heparin 25,000 units in dextrose 5% 250 mL (premix) infusion (18 Units/kg/hr × 106.6 kg Intravenous New Bag 6/10/21 3603)   sodium chloride flush 0.9 % injection 5-40 mL (has no administration in time range)   sodium chloride flush 0.9 % injection 5-40 mL (has no administration in time range)   0.9 % sodium chloride infusion (has no administration in time range)   ondansetron (ZOFRAN-ODT) disintegrating tablet 4 mg (has no administration in time range)     Or   ondansetron (ZOFRAN) injection 4 mg (has no administration in time range)   polyethylene glycol (GLYCOLAX) packet 17 g (has no administration in time range)   acetaminophen (TYLENOL) tablet 650 mg (has no administration in time range)     Or note that the withdrawal or failure to initiate urgent interventions for this patient would likely result in a life threatening deterioration or permanent disability. Accordingly this patient received the above mentioned time, excluding separately billable procedures. Plan of Care/Counseling:  Myself: Becca Whiteside DO reviewed today's visit with the patient in addition to providing specific details for the plan of care and counseling regarding the diagnosis and prognosis. Questions are answered at this time and are agreeable with the plan. Assessment      1. Acute pulmonary embolism without acute cor pulmonale, unspecified pulmonary embolism type ([address]    2. Hypoxia    3. Pneumonia of right lower lobe due to infectious organism      This patient's ED course included: a personal history and physicial examination  This patient has remained hemodynamically stable during their ED course. Plan   Admission to Telemetry Unit. Patient condition is stable. New Medications     New Prescriptions    No medications on file     Electronically signed by Becca Whiteside DO   DD: 6/10/21  **This report was transcribed using voice recognition software. Every effort was made to ensure accuracy; however, inadvertent computerized transcription errors may be present.   END OF PROVIDER NOTE        Becca Whiteside DO  06/10/21 1818

## 2021-06-10 NOTE — H&P
Hospitalist History & Physical      PCP: No primary care provider on file. Date of Admission: 6/10/2021    Date of Service: Pt seen/examined on 6/10/2021 and is admitted to Inpatient with expected LOS greater than two midnights due to medical therapy. Chief Complaint:  had concerns including Shortness of Breath (since mowing lawn on Monday, right chest pain) and Hematuria. History Of Present Illness:    Mr. Jonathan James, a 57y.o. year old male  who  has a past medical history of Hypertension. Patient presented to the ED with complaints of shortness of breath and right sided thoracic pain that started after mowing his lawn 4 days prior. He also endorses hematuria. His past medical history is significant for hypertension and smoking though he quit several years ago. He does smoke marijuana and drinks occasionally. He denies any cough, diaphoresis, nausea or vomiting, swelling or pain in the legs, palpitations, chest pain, and recent travel or sedentary periods. He denies a history of blood clots or family history of blood clotting disorders. He denies any sick contacts. He is not vaccinated for Covid. ER COURSE:  On arrival to ED he was tachycardic, hypertensive, and hypoxic requiring 2 L nasal cannula. Laboratory work-up was significant for elevated BNP-1317, elevated troponin-38, and leukocytosis-20.4. CXR with RLL pneumonia. CT of the chest with pulmonary emboli in the bilateral lower lobes, right lower lobe opacity thought to be infectious versus inflammatory related to infarction. CT of the abdomen with a tiny granuloma in the right lobe of the liver, splenic mass measuring 4.2 x 3.3 cm, colonic diverticulosis, and a small right inguinal hernia    Past Medical History:        Diagnosis Date    Hypertension      Past Surgical History:    History reviewed. No pertinent surgical history.     Medications Prior to Admission:      Prior to Admission medications    Not on File Allergies:  Patient has no known allergies. Social History:    RESIDENCE: Private  TOBACCO:   reports that he has quit smoking. He has never used smokeless tobacco.  ETOH:   reports current alcohol use. Family History:      History reviewed. No pertinent family history. Review of Systems: All bolded are positive; please see HPI  General:  Fever, chills, diaphoresis, fatigue, malaise, night sweats, weight loss  Psychological:  Anxiety, disorientation, hallucinations. ENT:  Epistaxis, headaches, vertigo, visual changes. Cardiovascular:  Chest pain, irregular heartbeats, palpitations, paroxysmal nocturnal dyspnea. Respiratory:  Shortness of breath, coughing, sputum production, hemoptysis, wheezing, orthopnea. Gastrointestinal:  Nausea, vomiting, diarrhea, heartburn, constipation, abdominal pain, hematemesis, hematochezia, melena, acholic stools  Genito-Urinary:  Dysuria, urgency, frequency, hematuria  Musculoskeletal:  Joint pain, joint stiffness, joint swelling, muscle pain  Neurology:  Headache, focal neurological deficits, weakness, numbness, paresthesia  Derm:  Rashes, ulcers, excoriations, bruising  Extremities:  Decreased ROM, peripheral edema, mottling    PHYSICAL EXAM:  BP (!) 162/106   Pulse 116   Temp 98 °F (36.7 °C)   Resp 24   Wt 235 lb (106.6 kg)   SpO2 96%   BMI 31.00 kg/m²   General appearance: Middle aged male in no apparent distress, appears stated age and cooperative. HEENT: Normal cephalic, atraumatic without obvious deformity. Pupils equal, round, and reactive to light. Extra ocular muscles intact. Conjunctivae/corneas clear. Neck: Supple, with full range of motion. No jugular venous distention. Trachea midline. Respiratory:  Clear to auscultation bilaterally though diminished in the RLL. No apparent distress. Cardiovascular:  Regular rate and rhythm. S1, S2 without murmurs, rubs, or gallops. PV: Brisk capillary refill. +2 pedal and radial pulses bilaterally.  No clubbing, cyanosis, edema of bilateral lower extremities. Abdomen: Soft, non-tender, non-distended. +BS  Musculoskeletal: No obvious deformities or erythematous or edematous joints. Skin: Normal skin color. No rashes or lesions. Neurologic:  Neurovascularly intact without any focal sensory/motor deficits. Cranial nerves: II-XII intact, grossly non-focal.  Psychiatric: Alert and oriented, thought content appropriate, normal insight    Reviewed EKG and CXR personally    CBC:   Recent Labs     06/10/21  1111   WBC 20.4*   RBC 5.23   HGB 16.7*   HCT 50.0   MCV 95.6   RDW 11.9        BMP:   Recent Labs     06/10/21  1111      K 4.5   CL 97*   CO2 26   BUN 13   CREATININE 1.2     LFT:  Recent Labs     06/10/21  1111   PROT 8.0   ALKPHOS 88   ALT 14   AST 14   BILITOT 1.2     PT/INR:   Recent Labs     06/10/21  1111   INR 1.3   APTT 26.7     Lactic Acid:   Lab Results   Component Value Date    LACTA 1.5 06/10/2021     Oupatient labs:  Lab Results   Component Value Date    INR 1.3 06/10/2021     Urinalysis:    Lab Results   Component Value Date    NITRU Negative 06/10/2021    WBCUA NONE 06/10/2021    BACTERIA RARE 06/10/2021    RBCUA 0-1 06/10/2021    BLOODU TRACE-INTACT 06/10/2021    SPECGRAV 1.020 06/10/2021    GLUCOSEU Negative 06/10/2021     Imaging:  CT ABDOMEN PELVIS WO CONTRAST Additional Contrast? None  Result Date: 6/10/2021  CTA OF THE CHEST: 1. Prominent bilateral pulmonary emboli, worse in the lower lobes. 2. Pulmonary infiltrate in the right lower lobe may represent an infectious/inflammatory process or infarction. 3. Small right pleural effusion. CT OF THE ABDOMEN AND PELVIS: 1.  No acute abnormality is seen in the abdomen or the pelvis. 2. 4.2 x 3.3 cm mass medial to the spleen may represent a splenule. Other etiologies cannot be excluded.   Further characterization with multiphasic contrast enhanced MRI of the abdomen is recommended (to determine whether it enhances in the pattern similar to that of the spleen). 3. No urolithiasis or hydronephrosis. Small fat containing right inguinal hernia. No evidence of fat strangulation. XR CHEST (2 VW)  Result Date: 6/10/2021  Right lower lobe airspace disease favored to represent pneumonia. CTA CHEST W CONTRAST  Result Date: 6/10/2021  CTA OF THE CHEST: 1. Prominent bilateral pulmonary emboli, worse in the lower lobes. 2. Pulmonary infiltrate in the right lower lobe may represent an infectious/inflammatory process or infarction. 3. Small right pleural effusion. CT OF THE ABDOMEN AND PELVIS: 1.  No acute abnormality is seen in the abdomen or the pelvis. 2. 4.2 x 3.3 cm mass medial to the spleen may represent a splenule. Other etiologies cannot be excluded. Further characterization with multiphasic contrast enhanced MRI of the abdomen is recommended (to determine whether it enhances in the pattern similar to that of the spleen). 3. No urolithiasis or hydronephrosis. Small fat containing right inguinal hernia. No evidence of fat strangulation. ASSESSMENT:  Bilateral pulmonary emboli  Acute hypoxic respiratory failure  Right lower lobe infiltrate-pneumonia versus infarction  Leukocytosis  Elevated BNP  Elevated troponin  Liver granuloma  Splenic mass, 4.2 x 3.3-? Splenule  Diverticulosis  Hypertension    PLAN:  Admit to intermediate  Continue heparin drip  Wean oxygen as tolerated  Ceftriaxone + doxy, check pro Justin  Awaiting molecular panel  Check hypercoag labs  Echo  Daily weights, I&O  Trend troponin  Check A1c, TSH, and lipids  Will need f/u on splenic mass OP    Diet: ADULT DIET; Regular;  Low Sodium (2 gm)  Code Status: Full Code  Surrogate decision maker confirmed with patient:   Extended Emergency Contact Information  Primary Emergency Contact: Fredrick Castillo  Address: [address] Phone: [phone]  Mobile Phone: [phone]  Relation: Child    DVT Prophylaxis: []Lovenox

## 2021-06-10 NOTE — ED NOTES
FIRST PROVIDER CONTACT ASSESSMENT NOTE      Department of Emergency Medicine   Admit Date: No admission date for patient encounter.     Chief Complaint: Shortness of Breath (since mowing lawn on Monday, right chest pain) and Hematuria      History of Present Illness:    Nani Bourgeois is a 57 y.o. male who presents to the ED for RT upper chest pain and SOB and concerns for blood in the urine.         -----------------END OF FIRST PROVIDER CONTACT ASSESSMENT NOTE--------------  Electronically signed by January Cristobal PA-C   DD: 6/10/21               January Cristobal PA-C  06/10/21 1056

## 2021-06-10 NOTE — ED NOTES
Blood cultures obtained from right ac, per policy. Set (one of two drawn at this time. Blood cultures obtained from right forearm, per policy. Set ( two of two) drawn at this time.                Isaias Jones RN  06/10/21 3555

## 2021-06-11 ENCOUNTER — APPOINTMENT (OUTPATIENT)
Dept: ULTRASOUND IMAGING | Age: 61
DRG: 175 | End: 2021-06-11

## 2021-06-11 LAB
ALBUMIN SERPL-MCNC: 3.2 G/DL (ref 3.5–5.2)
ALP BLD-CCNC: 80 U/L (ref 40–129)
ALT SERPL-CCNC: 12 U/L (ref 0–40)
ANION GAP SERPL CALCULATED.3IONS-SCNC: 13 MMOL/L (ref 7–16)
APTT: 54.8 SEC (ref 24.5–35.1)
AST SERPL-CCNC: 13 U/L (ref 0–39)
AT-III ACTIVITY: 81 % ACTIVITY (ref 83–121)
BASOPHILS ABSOLUTE: 0.06 E9/L (ref 0–0.2)
BASOPHILS RELATIVE PERCENT: 0.2 % (ref 0–2)
BILIRUB SERPL-MCNC: 1 MG/DL (ref 0–1.2)
BUN BLDV-MCNC: 14 MG/DL (ref 6–23)
BURR CELLS: ABNORMAL
C-REACTIVE PROTEIN: 33 MG/DL (ref 0–0.4)
CALCIUM SERPL-MCNC: 9 MG/DL (ref 8.6–10.2)
CHLORIDE BLD-SCNC: 97 MMOL/L (ref 98–107)
CHOLESTEROL, TOTAL: 126 MG/DL (ref 0–199)
CO2: 24 MMOL/L (ref 22–29)
CREAT SERPL-MCNC: 1.1 MG/DL (ref 0.7–1.2)
EOSINOPHILS ABSOLUTE: 0.03 E9/L (ref 0.05–0.5)
EOSINOPHILS RELATIVE PERCENT: 0.1 % (ref 0–6)
GFR AFRICAN AMERICAN: >60
GFR NON-AFRICAN AMERICAN: >60 ML/MIN/1.73
GLUCOSE BLD-MCNC: 121 MG/DL (ref 74–99)
HBA1C MFR BLD: 5.4 % (ref 4–5.6)
HCT VFR BLD CALC: 43.3 % (ref 37–54)
HCT VFR BLD CALC: 43.5 % (ref 37–54)
HDLC SERPL-MCNC: 35 MG/DL
HEMOGLOBIN: 14.4 G/DL (ref 12.5–16.5)
HEMOGLOBIN: 14.8 G/DL (ref 12.5–16.5)
IMMATURE GRANULOCYTES #: 0.2 E9/L
IMMATURE GRANULOCYTES %: 0.8 % (ref 0–5)
LDL CHOLESTEROL CALCULATED: 77 MG/DL (ref 0–99)
LUPUS ANTICOAG DVVT: ABNORMAL
LYMPHOCYTES ABSOLUTE: 2.04 E9/L (ref 1.5–4)
LYMPHOCYTES RELATIVE PERCENT: 8.3 % (ref 20–42)
MAGNESIUM: 2 MG/DL (ref 1.6–2.6)
MCH RBC QN AUTO: 31.9 PG (ref 26–35)
MCH RBC QN AUTO: 32.2 PG (ref 26–35)
MCHC RBC AUTO-ENTMCNC: 33.3 % (ref 32–34.5)
MCHC RBC AUTO-ENTMCNC: 34 % (ref 32–34.5)
MCV RBC AUTO: 94.6 FL (ref 80–99.9)
MCV RBC AUTO: 96 FL (ref 80–99.9)
MONOCYTES ABSOLUTE: 2.29 E9/L (ref 0.1–0.95)
MONOCYTES RELATIVE PERCENT: 9.3 % (ref 2–12)
NEUTROPHILS ABSOLUTE: 20.02 E9/L (ref 1.8–7.3)
NEUTROPHILS RELATIVE PERCENT: 81.3 % (ref 43–80)
PDW BLD-RTO: 11.9 FL (ref 11.5–15)
PDW BLD-RTO: 12.1 FL (ref 11.5–15)
PHOSPHORUS: 2.5 MG/DL (ref 2.5–4.5)
PLATELET # BLD: 257 E9/L (ref 130–450)
PLATELET # BLD: 296 E9/L (ref 130–450)
PMV BLD AUTO: 9.8 FL (ref 7–12)
PMV BLD AUTO: 9.9 FL (ref 7–12)
POIKILOCYTES: ABNORMAL
POLYCHROMASIA: ABNORMAL
POTASSIUM SERPL-SCNC: 3.8 MMOL/L (ref 3.5–5)
PROTEIN C ACTIVITY: 80 % ACTIVITY (ref 68–165)
RBC # BLD: 4.51 E12/L (ref 3.8–5.8)
RBC # BLD: 4.6 E12/L (ref 3.8–5.8)
SEDIMENTATION RATE, ERYTHROCYTE: 104 MM/HR (ref 0–15)
SODIUM BLD-SCNC: 134 MMOL/L (ref 132–146)
TOTAL PROTEIN: 7.4 G/DL (ref 6.4–8.3)
TRIGL SERPL-MCNC: 72 MG/DL (ref 0–149)
TSH SERPL DL<=0.05 MIU/L-ACNC: 0.94 UIU/ML (ref 0.27–4.2)
VLDLC SERPL CALC-MCNC: 14 MG/DL
WBC # BLD: 24.6 E9/L (ref 4.5–11.5)
WBC # BLD: 24.7 E9/L (ref 4.5–11.5)

## 2021-06-11 PROCEDURE — 6360000002 HC RX W HCPCS: Performed by: INTERNAL MEDICINE

## 2021-06-11 PROCEDURE — 84443 ASSAY THYROID STIM HORMONE: CPT

## 2021-06-11 PROCEDURE — 85730 THROMBOPLASTIN TIME PARTIAL: CPT

## 2021-06-11 PROCEDURE — 80061 LIPID PANEL: CPT

## 2021-06-11 PROCEDURE — 85027 COMPLETE CBC AUTOMATED: CPT

## 2021-06-11 PROCEDURE — 93971 EXTREMITY STUDY: CPT | Performed by: RADIOLOGY

## 2021-06-11 PROCEDURE — 6360000002 HC RX W HCPCS: Performed by: EMERGENCY MEDICINE

## 2021-06-11 PROCEDURE — 83036 HEMOGLOBIN GLYCOSYLATED A1C: CPT

## 2021-06-11 PROCEDURE — 85651 RBC SED RATE NONAUTOMATED: CPT

## 2021-06-11 PROCEDURE — 2500000003 HC RX 250 WO HCPCS: Performed by: INTERNAL MEDICINE

## 2021-06-11 PROCEDURE — 2060000000 HC ICU INTERMEDIATE R&B

## 2021-06-11 PROCEDURE — 2580000003 HC RX 258: Performed by: INTERNAL MEDICINE

## 2021-06-11 PROCEDURE — 6370000000 HC RX 637 (ALT 250 FOR IP): Performed by: INTERNAL MEDICINE

## 2021-06-11 PROCEDURE — 6360000002 HC RX W HCPCS: Performed by: NURSE PRACTITIONER

## 2021-06-11 PROCEDURE — 85613 RUSSELL VIPER VENOM DILUTED: CPT

## 2021-06-11 PROCEDURE — 99255 IP/OBS CONSLTJ NEW/EST HI 80: CPT | Performed by: INTERNAL MEDICINE

## 2021-06-11 PROCEDURE — 36415 COLL VENOUS BLD VENIPUNCTURE: CPT

## 2021-06-11 PROCEDURE — 87449 NOS EACH ORGANISM AG IA: CPT

## 2021-06-11 PROCEDURE — 86140 C-REACTIVE PROTEIN: CPT

## 2021-06-11 PROCEDURE — 85025 COMPLETE CBC W/AUTO DIFF WBC: CPT

## 2021-06-11 PROCEDURE — 93970 EXTREMITY STUDY: CPT

## 2021-06-11 PROCEDURE — 80053 COMPREHEN METABOLIC PANEL: CPT

## 2021-06-11 PROCEDURE — 83735 ASSAY OF MAGNESIUM: CPT

## 2021-06-11 PROCEDURE — 84100 ASSAY OF PHOSPHORUS: CPT

## 2021-06-11 RX ORDER — PANTOPRAZOLE SODIUM 40 MG/1
40 TABLET, DELAYED RELEASE ORAL
Status: DISCONTINUED | OUTPATIENT
Start: 2021-06-12 | End: 2021-06-17 | Stop reason: HOSPADM

## 2021-06-11 RX ORDER — KETOROLAC TROMETHAMINE 30 MG/ML
30 INJECTION, SOLUTION INTRAMUSCULAR; INTRAVENOUS EVERY 6 HOURS PRN
Status: DISPENSED | OUTPATIENT
Start: 2021-06-11 | End: 2021-06-13

## 2021-06-11 RX ORDER — FLUTICASONE PROPIONATE 50 MCG
2 SPRAY, SUSPENSION (ML) NASAL DAILY
Status: DISCONTINUED | OUTPATIENT
Start: 2021-06-11 | End: 2021-06-17 | Stop reason: HOSPADM

## 2021-06-11 RX ADMIN — Medication 10 ML: at 09:00

## 2021-06-11 RX ADMIN — ACETAMINOPHEN 650 MG: 325 TABLET ORAL at 04:20

## 2021-06-11 RX ADMIN — DOXYCYCLINE 100 MG: 100 INJECTION, POWDER, LYOPHILIZED, FOR SOLUTION INTRAVENOUS at 15:52

## 2021-06-11 RX ADMIN — FLUTICASONE PROPIONATE 2 SPRAY: 50 SPRAY, METERED NASAL at 16:48

## 2021-06-11 RX ADMIN — KETOROLAC TROMETHAMINE 30 MG: 30 INJECTION, SOLUTION INTRAMUSCULAR; INTRAVENOUS at 13:48

## 2021-06-11 RX ADMIN — HEPARIN SODIUM 16 UNITS/KG/HR: 10000 INJECTION, SOLUTION INTRAVENOUS at 21:14

## 2021-06-11 RX ADMIN — CEFTRIAXONE 1000 MG: 1 INJECTION, POWDER, FOR SOLUTION INTRAMUSCULAR; INTRAVENOUS at 15:45

## 2021-06-11 RX ADMIN — DOXYCYCLINE 100 MG: 100 INJECTION, POWDER, LYOPHILIZED, FOR SOLUTION INTRAVENOUS at 02:49

## 2021-06-11 RX ADMIN — Medication 10 ML: at 21:15

## 2021-06-11 ASSESSMENT — PAIN DESCRIPTION - FREQUENCY
FREQUENCY: CONTINUOUS
FREQUENCY: CONTINUOUS

## 2021-06-11 ASSESSMENT — PAIN DESCRIPTION - PAIN TYPE: TYPE: OTHER (COMMENT)

## 2021-06-11 ASSESSMENT — PAIN SCALES - GENERAL
PAINLEVEL_OUTOF10: 3
PAINLEVEL_OUTOF10: 6
PAINLEVEL_OUTOF10: 0
PAINLEVEL_OUTOF10: 1

## 2021-06-11 ASSESSMENT — PAIN DESCRIPTION - LOCATION: LOCATION: OTHER (COMMENT)

## 2021-06-11 NOTE — CONSULTS
Rivas Estrella M.D.,[facility]  Usha Ryan D.O., F.A.C.O.I., Jeffrey Betts M.D. Gaviota Armenta M.D., General Julius M.D. Phoenix Beckwith D.O. Patient:  Shilpi Benitez 57 y.o. male MRN: [id number]     Date of Service: 6/11/2021      PULMONARY CONSULTATION    Reason for Consultation: Acute PE  Referring Physician: Dr Nikhil Ge with the referring physician will be sent via the electronic medical record. Chief Complaint: shortness of breath    CODE STATUS: FULL     SUBJECTIVE:  HPI:  Shilpi Benitez is a 57 y.o. male who we are asked to evaluate for acute bilateral PE. He has not been previously followed by our pulmonary practice. He has a PMH of hypertension and diverticulosis. He is a former smoker quit many years ago. Denies formal dx of copd or asthma. No prior hx of blood clots or stroke. Denies long trips or recent travel. No recent surgery . No family hx of clots or early MI, stroke. He does admit to period of 1 wk inactivity last week after he recently retired, did not do a lot of activity, immobile. He is not up to date on preventive testing- screenings, no colonoscopy or prostate screen completed. He presented to ED at [facility] on 6/10 after a 4 day hx of worsening SOB, he noticed Sx started while mowing his grass. He began having  Right sided chest discomfort and mild hematuria. Occasional cough with small white mucus. On admission CXR with evidence of RLL pneumonia. CTA chest obtained with evidence of bilateral pulmonary emboli, notably in lower lobes. No lymphadenopathy. Small right pleural effusion. Pulmonary infiltrate RLL may represent pulmonary infarct. Ct abd identified splenic mass 4.2 x 3.3 question of splenule, liver granuloma. Lab testing:  Leukocytosis 24.7, Procal 0.33. pro bnp 1317, Na 134, K 3.8, BUN 14, creat 1.1, Troponin 38, Hgb 14. 8.molecular viral panel including SARS-CoV-2 negative. He is started on rocephin and doxy for RLL CAP. IV heparin infusion started for tx of bilateral acute PEs. 2 D echo and US BLE pending. Temps 100.8 F. BP elevated 158/111. Past Medical History:   Diagnosis Date    Hypertension        History reviewed. No pertinent surgical history. History reviewed. No pertinent family history. Social History:   Social History     Socioeconomic History    Marital status: Single     Spouse name: Not on file    Number of children: Not on file    Years of education: Not on file    Highest education level: Not on file   Occupational History    Not on file   Tobacco Use    Smoking status: Former Smoker    Smokeless tobacco: Never Used   Substance and Sexual Activity    Alcohol use: Yes    Drug use: Not on file    Sexual activity: Not on file   Other Topics Concern    Not on file   Social History Narrative    Not on file     Social Determinants of Health     Financial Resource Strain:     Difficulty of Paying Living Expenses:    Food Insecurity:     Worried About Running Out of Food in the Last Year:     [address] in the Last Year:    Transportation Needs:     Lack of Transportation (Medical):  Lack of Transportation (Non-Medical):    Physical Activity:     Days of Exercise per Week:     Minutes of Exercise per Session:    Stress:     Feeling of Stress :    Social Connections:     Frequency of Communication with Friends and Family:     Frequency of Social Gatherings with Friends and Family:     Attends Christian Services:     Active Member of Clubs or Organizations:     Attends Club or Organization Meetings:     Marital Status:    Intimate Partner Violence:     Fear of Current or Ex-Partner:     Emotionally Abused:     Physically Abused:     Sexually Abused:      Smoking history: The patient is a former smoker 1-2 ppd x 10 yrs  ETOH:   reports current alcohol use. 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 Recent travel history none. There is not  history of recreational or IV drug use. There is not hot tub exposure. The patient does not have any exotic pets, turtles or exotic birds. Vaccines:       Immunization History   Administered Date(s) Administered    Tdap (Boostrix, Adacel) 07/26/2018, 08/16/2019        Home Meds: No medications prior to admission. CURRENT MEDS :  Scheduled Meds:   sodium chloride flush  5-40 mL Intravenous 2 times per day    cefTRIAXone (ROCEPHIN) IV  1,000 mg Intravenous Q24H    doxycycline (VIBRAMYCIN) IV  100 mg Intravenous Q12H       Continuous Infusions:   heparin (PORCINE) Infusion 16.041 Units/kg/hr (06/11/21 8545)    sodium chloride         No Known Allergies    REVIEW OF SYSTEMS:  Constitutional: Denies fever, weight loss, night sweats, and fatigue  Skin: Denies pigmentation, dark lesions, and rashes   HEENT: Denies hearing loss, tinnitus, ear drainage, epistaxis, sore throat, and hoarseness. Cardiovascular: Denies palpitations, chest pain, and chest pressure.   Respiratory: Denies    hemoptysis, apnea, and choking. +right chest discomfort, dyspnea  Gastrointestinal: Denies nausea, vomiting, poor appetite, diarrhea, heartburn or reflux  Genitourinary: Denies dysuria, frequency, urgency or hematuria  Musculoskeletal: Denies myalgias, muscle weakness, and bone pain  Neurological: Denies dizziness, vertigo, headache, and focal weakness  Psychological: Denies anxiety and depression  Endocrine: Denies heat intolerance and cold intolerance  Hematopoietic/Lymphatic: Denies bleeding problems and blood transfusions    OBJECTIVE:   BP (!) 158/111   Pulse 107   Temp 100.8 °F (38.2 °C) (Oral)   Resp 24   Wt 236 lb 14.4 oz (107.5 kg)   SpO2 98%   BMI 31.26 kg/m²   SpO2 Readings from Last 1 Encounters:   06/10/21 98%        I/O:    Intake/Output Summary (Last 24 hours) at 6/11/2021 0921  Last data filed at 6/11/2021 0817  Gross per 24 hour   Intake 303 ml   Output 900 ml 14 06/11/2021    CREATININE 1.1 06/11/2021    LABALBU 3.2 06/11/2021    CALCIUM 9.0 06/11/2021    GFRAA >60 06/11/2021    LABGLOM >60 06/11/2021     Lab Results   Component Value Date    PROTIME 14.8 06/10/2021    INR 1.3 06/10/2021     Recent Labs     06/10/21  1111   PROBNP 1,317*     No results for input(s): TROPONINI in the last 72 hours. Recent Labs     06/10/21  2019   PROCAL 0.33*     This SmartLink has not been configured with any valid records. Micro:  No results for input(s): CULTRESP in the last 72 hours. No results for input(s): LABGRAM in the last 72 hours. No results for input(s): LEGUR in the last 72 hours. No results for input(s): STREPNEUMAGU in the last 72 hours. No results for input(s): LP1UAG in the last 72 hours. Assessment:  1. Acute respiratory failure with hypoxia  2. Acute bilateral PE's  3. RLL pneumonia-CAP vs Pulmonary infarct  4. Mild hematuria on admission  5. Splenic mass/splenule  6. Liver granuloma  7. Leukocytosis   8. HTN  9. Former nicotine dependence in remission  10. Elevated pro bnp   11. Mild elevation of troponin    Plan:  1. Oxygen therapy 2 liters NC keep >92%  2. Will need ambulatory O2 testing prior to dc. 3. IV heparin infusion for clot stabilization. Will transition to [address] hgb 14.8.  4. 2 D echo pending to evaluate for RV strain. Mild elevation of troponin  5. US BLE assess for dvt   6. Check thrombophilia panel and hypercoagulable work up.weakly positive lupus anticoagulant. Consider hematology evaluation. 7. Rocephin and doxy for CAP coverage. Procal 0.33  8. Toradol Q 6 x 48 hrs for pleuritic right sided chest discomfort  9. Check strep pneumo, legionella urine Ag's, trend inflammatory markers  10. DVT, GI prophylaxis add protonix  11. Consider OP PFTs when recovered  12. Work up splenic mass as OP  13. Repeat VQ 3 months as OP      Thank you for allowing me to participate in the care of Jonathan James. Please feel free to call with questions.

## 2021-06-11 NOTE — CONSULTS
Department of [facility] Oncology  Attending Consult Note      Reason for Visit:   Bilateral unprovoked PE     Referring Physician:  Dr. Portia Brunson     PCP:  No primary care provider on file. History of Present Illness:  57year old male PMH of HTN, diverticulitis and former smoker (1ppd x10 years)  that presented to ER 06/10/2021 for SOB with right sided thoracic pain since Monday and hematuria. Denies cough, nausea, vomiting/ Denies recent travel or surgery. Reports he retired 2 weeks ago and was less mobile/\"sitting around more then usual\" for about a week for his first week of penitentiary but otherwise has not had long period of immobility   Denies FH of blood clotting disorder, MI, stroke  or anyone on a blood thinner. Reports his blood looked \"dark\" but did not see blood in his urine. Reports intermittent small amount of  blood with big bowel movements that is bright red-denies any colonoscopy in past.        Upon arrival patient was tachycardic at 116, hypertensive 162/106, and hypoxic on RA requiring 2L nasal cannula  Labs significant for leukocytosis 20.4, BNP 1317 and troponin 38  CXR with RLL infiltrate  CTA chest with prominate pulmonary emboli bilaterally, more notably in the lower lobes. CT abdomen pelvis with a 4.2 x 3.3 cm mass medial to the spleen. Blood cultures sent and pending-antibiotics started  UA positive for protein and trace blood     Review of Systems;  CONSTITUTIONAL: No fever, chills. Respiratory: Conversational SOB, denies cough or hemoptysis   Cardiac: Denies chest pain or palpitation. Denies swelling of extremities   Abdominal: Reports mild intermittent left lower quadrant described as \"twingy and crampy\"   GI: denies nausea or vomiting.  Reports hematochezia, see HPI  : Reports dark urine see HPI, denies burning or pain with urination  Neuro: Denies syncope or headache   Skin: Denies lesions or reash   Review of Systems    Remainder:  ROS NEGATIVE    Past Medical Supple without lymphadenopathy   Respiratory: clear to auscultation, diminished bases, SOB with conversation on 2L NC. No accessory muscle use   Cardiac: Tachycardic, regular rhythm, pulses intact, no edema   Abdominal: Soft, slightly tender to left lower quadrant, active BS   Extremities; No clubbing or cyanosis   Skin: NO lesions or rash   Neuro: Alert and oriented x3    ECOG PS 1    Impression/Plan:  57year old male PMH of HTN, diverticulitis and former smoker (1ppd x10 years)  that presented to ER 06/10/2021 for SOB with right sided thoracic pain since Monday and hematuria. Denies cough, nausea, vomiting/ Denies recent travel, surgery or immobility. Garnett Zamora FH of blood clotting disorder or anyone on a blood thinner. Upon arrival patient was tachycardic at 116, hypertensive 162/106, and hypoxic on RA requiring 2L nasal cannula  Labs significant for leukocytosis 20.4, BNP 1317 and troponin 38  CXR with RLL infiltrate  CTA chest with prominate pulmonary emboli bilaterally, more notably in the lower lobes. CT abdomen pelvis with a 4.2 x 3.3 cm mass medial to the spleen. Blood cultures sent and pending-antibiotics started  UA positive for protein and trace blood     -Continue antibiotics for CAP per primary-rocephin and doxy   Heparin drip started   -Hypercoag work up ordered for non provoked bilateral PE   Thrombophilia panel: pending    Protein C: 80   Protein S: pending    Homocystine: 9.8   DRVVT: weak positive    Antithormbin III: 81   APTT: 54.8   Peripheral blood smear     -Will repeat DRVVT later  -Transition to a DOAC upon discharge   -Molecular panel negative   -Echo pending   -MRI abdominal for mass tomorrow     Thank you for allowing us to participate in the care of LISETTE Lagos CNP      The patient was seen and examined, I agree with HIRAM Mancilla's H&P assessment and plan as outlined.       Josemanuel Fine MD   HEMATOLOGY/MEDICAL ONCOLOGY  [facility] [address] MED ONCOLOGY  [address] [facility] [address]  Dept: [phone]

## 2021-06-11 NOTE — CARE COORDINATION
Transition of Care-Met with patient and son at bedside, patient and son reside in a tow story home, a few steps to enter residence. Bedroom and main bath are on second level, half bath on first level. Prior to admission patient was independent, no history of HHC, SNF or DME. Patient is currently on 2L NC, wears bno home oxygen, patient listed as self pay, call placed to . Discharge plan is to return home no needs, son to transport. PCP is Dr. Deepa Hogue, preferred pharmacy is [facility] on [address] Patient is currently on a Heparin gtt for PE management.      Carlosardine President BSN, RN  [facility]   [phone]

## 2021-06-11 NOTE — PROGRESS NOTES
Hospitalist Progress Note      SYNOPSIS: Patient admitted on 6/10/2021 for evaluation of shortness of breath, patient was found to have bilateral pulmonary emboli. 57year old male PMH of HTN, diverticulitis and former smoker (1ppd x10 years)  that presented to ER 06/10/2021 for SOB with right sided thoracic pain since Monday and hematuria. Denies cough, nausea, vomiting/ Denies recent travel or surgery. Reports he retired 2 weeks ago and was less mobile/\"sitting around more then usual\" for about a week for his first week of half-way but otherwise has not had long period of immobility   Denies FH of blood clotting disorder, MI, stroke  or anyone on a blood thinner. Reports his blood looked \"dark\" but did not see blood in his urine. SUBJECTIVE: Patient is complaining of anxiety and he feels more short of breath. Patient seen and examined  Records reviewed. Stable overnight. No other overnight issues reported. Temp (24hrs), Av.5 °F (37.5 °C), Min:98.1 °F (36.7 °C), Max:100.8 °F (38.2 °C)    DIET: ADULT DIET; Regular; Low Sodium (2 gm)  CODE: Full Code    Intake/Output Summary (Last 24 hours) at 2021 1138  Last data filed at 2021 1106  Gross per 24 hour   Intake 303 ml   Output 1100 ml   Net -797 ml       OBJECTIVE:    BP (!) 159/94   Pulse 107   Temp 98.1 °F (36.7 °C) (Oral)   Resp 24   Wt 236 lb 14.4 oz (107.5 kg)   SpO2 92%   BMI 31.26 kg/m²     General appearance: No apparent distress, although he is anxious. Appears stated age and cooperative. HEENT:  Conjunctivae/corneas clear. Neck: Supple. No jugular venous distention. Respiratory: Clear to auscultation bilaterally, normal respiratory effort, symmetrical bilaterally. Cardiovascular: Regular rate rhythm, normal S1-S2  Abdomen: Soft, nontender, nondistended  Musculoskeletal: No clubbing, cyanosis, minimal bilateral lower extremity edema.   Skin:  No rashes  on visible skin  Neurologic: awake, alert and following commands     ASSESSMENT:  Acute respiratory failure with hypoxia  Bilateral pulmonary emboli  Right lower lobe pneumonia/community-acquired pneumonia versus pulmonary infarction  Splenic mass  Leukocytosis  Elevated proBNP  Mild elevated troponin       PLAN:  Plan discussed with Dr. Marques Almazan and CT scan reviewed  Continue anticoagulation and further evaluation for coagulopathy  Hematology consult  Rocephin and doxy for community-acquired pneumonia coverage  Ultrasound rule out DVT. DISPOSITION:     Medications:  REVIEWED DAILY    Infusion Medications    heparin (PORCINE) Infusion 16.041 Units/kg/hr (06/11/21 7052)    sodium chloride       Scheduled Medications    [START ON 6/12/2021] pantoprazole  40 mg Oral QAM AC    sodium chloride flush  5-40 mL Intravenous 2 times per day    cefTRIAXone (ROCEPHIN) IV  1,000 mg Intravenous Q24H    doxycycline (VIBRAMYCIN) IV  100 mg Intravenous Q12H     PRN Meds: heparin (porcine), heparin (porcine), sodium chloride flush, sodium chloride, ondansetron **OR** ondansetron, polyethylene glycol, acetaminophen **OR** acetaminophen, perflutren lipid microspheres, metoprolol    Labs:     Recent Labs     06/10/21  1111 06/10/21  2018 06/11/21  0417   WBC 20.4* 23.3* 24.7*   HGB 16.7* 15.6 14.8   HCT 50.0 45.5 43.5    272 257       Recent Labs     06/10/21  1111 06/11/21  0417    134   K 4.5 3.8   CL 97* 97*   CO2 26 24   BUN 13 14   CREATININE 1.2 1.1   CALCIUM 9.5 9.0   PHOS  --  2.5       Recent Labs     06/10/21  1111 06/11/21 0417   PROT 8.0 7.4   ALKPHOS 88 80   ALT 14 12   AST 14 13   BILITOT 1.2 1.0       Recent Labs     06/10/21  1111   INR 1.3       No results for input(s): Orlando Azul in the last 72 hours.     Chronic labs:    Lab Results   Component Value Date    CHOL 126 06/11/2021    TRIG 72 06/11/2021    HDL 35 06/11/2021    LDLCALC 77 06/11/2021    TSH 0.936 06/11/2021    INR 1.3 06/10/2021    LABA1C 5.4 06/11/2021       Radiology: REVIEWED DAILY    +++++++++++++++++++++++++++++++++++++++++++++++++  Claudette Days, MD  [address]  +++++++++++++++++++++++++++++++++++++++++++++++++  NOTE: This report was transcribed using voice recognition software. Every effort was made to ensure accuracy; however, inadvertent computerized transcription errors may be present.

## 2021-06-12 LAB
ALBUMIN SERPL-MCNC: 2.1 G/DL (ref 3.5–5.2)
ALP BLD-CCNC: 78 U/L (ref 40–129)
ALT SERPL-CCNC: 15 U/L (ref 0–40)
ANION GAP SERPL CALCULATED.3IONS-SCNC: 13 MMOL/L (ref 7–16)
APTT: 47.8 SEC (ref 24.5–35.1)
APTT: 65.7 SEC (ref 24.5–35.1)
AST SERPL-CCNC: 21 U/L (ref 0–39)
BILIRUB SERPL-MCNC: 0.6 MG/DL (ref 0–1.2)
BUN BLDV-MCNC: 26 MG/DL (ref 6–23)
CALCIUM SERPL-MCNC: 8.7 MG/DL (ref 8.6–10.2)
CHLORIDE BLD-SCNC: 96 MMOL/L (ref 98–107)
CO2: 22 MMOL/L (ref 22–29)
CREAT SERPL-MCNC: 1.3 MG/DL (ref 0.7–1.2)
GFR AFRICAN AMERICAN: >60
GFR NON-AFRICAN AMERICAN: 56 ML/MIN/1.73
GLUCOSE BLD-MCNC: 123 MG/DL (ref 74–99)
HCT VFR BLD CALC: 41.2 % (ref 37–54)
HEMOGLOBIN: 14.3 G/DL (ref 12.5–16.5)
L. PNEUMOPHILA SEROGP 1 UR AG: NORMAL
MCH RBC QN AUTO: 32.2 PG (ref 26–35)
MCHC RBC AUTO-ENTMCNC: 34.7 % (ref 32–34.5)
MCV RBC AUTO: 92.8 FL (ref 80–99.9)
PDW BLD-RTO: 11.9 FL (ref 11.5–15)
PLATELET # BLD: 275 E9/L (ref 130–450)
PMV BLD AUTO: 9.5 FL (ref 7–12)
POTASSIUM SERPL-SCNC: 4.2 MMOL/L (ref 3.5–5)
RBC # BLD: 4.44 E12/L (ref 3.8–5.8)
REASON FOR REJECTION: NORMAL
REJECTED TEST: NORMAL
SODIUM BLD-SCNC: 131 MMOL/L (ref 132–146)
STREP PNEUMONIAE ANTIGEN, URINE: NORMAL
TOTAL PROTEIN: 6.8 G/DL (ref 6.4–8.3)
WBC # BLD: 20.8 E9/L (ref 4.5–11.5)

## 2021-06-12 PROCEDURE — 2500000003 HC RX 250 WO HCPCS: Performed by: INTERNAL MEDICINE

## 2021-06-12 PROCEDURE — 6360000002 HC RX W HCPCS: Performed by: NURSE PRACTITIONER

## 2021-06-12 PROCEDURE — 85027 COMPLETE CBC AUTOMATED: CPT

## 2021-06-12 PROCEDURE — 2700000000 HC OXYGEN THERAPY PER DAY

## 2021-06-12 PROCEDURE — 2580000003 HC RX 258: Performed by: INTERNAL MEDICINE

## 2021-06-12 PROCEDURE — 6360000002 HC RX W HCPCS: Performed by: EMERGENCY MEDICINE

## 2021-06-12 PROCEDURE — 85730 THROMBOPLASTIN TIME PARTIAL: CPT

## 2021-06-12 PROCEDURE — 6370000000 HC RX 637 (ALT 250 FOR IP): Performed by: NURSE PRACTITIONER

## 2021-06-12 PROCEDURE — 36415 COLL VENOUS BLD VENIPUNCTURE: CPT

## 2021-06-12 PROCEDURE — 6360000002 HC RX W HCPCS: Performed by: INTERNAL MEDICINE

## 2021-06-12 PROCEDURE — 80053 COMPREHEN METABOLIC PANEL: CPT

## 2021-06-12 PROCEDURE — 2060000000 HC ICU INTERMEDIATE R&B

## 2021-06-12 RX ADMIN — DOXYCYCLINE 100 MG: 100 INJECTION, POWDER, LYOPHILIZED, FOR SOLUTION INTRAVENOUS at 16:15

## 2021-06-12 RX ADMIN — HEPARIN SODIUM 4260 UNITS: 1000 INJECTION INTRAVENOUS; SUBCUTANEOUS at 06:35

## 2021-06-12 RX ADMIN — PANTOPRAZOLE SODIUM 40 MG: 40 TABLET, DELAYED RELEASE ORAL at 06:37

## 2021-06-12 RX ADMIN — KETOROLAC TROMETHAMINE 30 MG: 30 INJECTION, SOLUTION INTRAMUSCULAR; INTRAVENOUS at 00:31

## 2021-06-12 RX ADMIN — KETOROLAC TROMETHAMINE 30 MG: 30 INJECTION, SOLUTION INTRAMUSCULAR; INTRAVENOUS at 11:05

## 2021-06-12 RX ADMIN — Medication 10 ML: at 11:05

## 2021-06-12 RX ADMIN — DOXYCYCLINE 100 MG: 100 INJECTION, POWDER, LYOPHILIZED, FOR SOLUTION INTRAVENOUS at 04:20

## 2021-06-12 RX ADMIN — CEFTRIAXONE 1000 MG: 1 INJECTION, POWDER, FOR SOLUTION INTRAMUSCULAR; INTRAVENOUS at 16:15

## 2021-06-12 RX ADMIN — SODIUM CHLORIDE, PRESERVATIVE FREE 10 ML: 5 INJECTION INTRAVENOUS at 16:16

## 2021-06-12 RX ADMIN — FLUTICASONE PROPIONATE 2 SPRAY: 50 SPRAY, METERED NASAL at 11:05

## 2021-06-12 RX ADMIN — HEPARIN SODIUM 18 UNITS/KG/HR: 10000 INJECTION, SOLUTION INTRAVENOUS at 12:12

## 2021-06-12 ASSESSMENT — PAIN DESCRIPTION - FREQUENCY: FREQUENCY: INTERMITTENT

## 2021-06-12 ASSESSMENT — PAIN SCALES - GENERAL
PAINLEVEL_OUTOF10: 0
PAINLEVEL_OUTOF10: 8
PAINLEVEL_OUTOF10: 0
PAINLEVEL_OUTOF10: 6

## 2021-06-12 ASSESSMENT — PAIN DESCRIPTION - LOCATION: LOCATION: CHEST

## 2021-06-12 ASSESSMENT — PAIN DESCRIPTION - ORIENTATION: ORIENTATION: RIGHT

## 2021-06-12 NOTE — PROGRESS NOTES
Bettye Lopez M.D.,[facility]  Yaneth Helms D.O., F.A.C.O.I., Josh Slater M.D. Nikkie Carrington M.D., Keelye Hernandez M.D. Suzanna Wise D.O. Daily Pulmonary Progress Note    Patient:  Jonathan James 57 y.o. male MRN: [id number]     Date of Service: 6/12/2021      Synopsis     We are following patient for acute pulmonary embolism with moderate clot burden    \"CC\" shortness of breath    Code status: Full      Subjective      Patient was seen and examined. Lying in bed appears to be slightly improved with breathing compared to yesterday. Still with dyspnea at rest.  Oxygen on at 2 L nasal cannula. No fever this morning. Zoraida on IV heparin infusion for clot stabilization. Right-sided chest discomfort has improved since starting steroids. Review of Systems:  Constitutional: Denies fever, weight loss, night sweats, and fatigue  Skin: Denies pigmentation, dark lesions, and rashes   HEENT: Denies hearing loss, tinnitus, ear drainage, epistaxis, sore throat, and hoarseness. Cardiovascular: Denies palpitations, chest pain, and chest pressure.   Respiratory: Dyspnea and right-sided chest discomfort improved  Gastrointestinal: Denies nausea, vomiting, poor appetite, diarrhea, heartburn or reflux  Genitourinary: Denies dysuria, frequency, urgency or hematuria  Musculoskeletal: Denies myalgias, muscle weakness, and bone pain  Neurological: Denies dizziness, vertigo, headache, and focal weakness  Psychological: Denies anxiety and depression  Endocrine: Denies heat intolerance and cold intolerance  Hematopoietic/Lymphatic: Denies bleeding problems and blood transfusions    24-hour events:  None    Objective   Vitals: BP (!) 143/91   Pulse 108   Temp 98.6 °F (37 °C) (Oral)   Resp 22   Wt 236 lb 14.4 oz (107.5 kg)   SpO2 94%   BMI 31.26 kg/m²     I/O:    Intake/Output Summary (Last 24 hours) at 6/12/2021 1021  Last data filed at 6/12/2021 0420  Gross per 24 hour   Intake 480 ml Output 550 ml   Net -70 ml       Vent Information  SpO2: 94 %                CURRENT MEDS :  Scheduled Meds:   pantoprazole  40 mg Oral QAM AC    fluticasone  2 spray Each Nostril Daily    sodium chloride flush  5-40 mL Intravenous 2 times per day    cefTRIAXone (ROCEPHIN) IV  1,000 mg Intravenous Q24H    doxycycline (VIBRAMYCIN) IV  100 mg Intravenous Q12H       Physical Exam:    General: The patient is lying in bed without any acute  distress. Breathing is not labored at rest  HEENT: Pupils are equal round and reactive to light, there are no oral lesions and no post-nasal drip   Neck: supple without adenopathy  Cardiovascular: regular rate and rhythm without murmur or gallop  Respiratory: Clear to auscultation bilaterally without wheezing or crackles. Air entry is symmetric  Abdomen: soft, non-tender, non-distended, normal bowel sounds  Extremities: warm,  no clubbing trace LE edema  Skin: no rash or lesion  Neurologic: CN II-XII grossly intact, no focal deficits  Pertinent/ New Labs and Imaging Studies     Imaging Personally Reviewed:    CTA chest  CTA OF THE CHEST:       1. Prominent bilateral pulmonary emboli, worse in the lower lobes. 2. Pulmonary infiltrate in the right lower lobe may represent an   infectious/inflammatory process or infarction. 3. Small right pleural effusion. CT OF THE ABDOMEN AND PELVIS:       1.  No acute abnormality is seen in the abdomen or the pelvis. 2. 4.2 x 3.3 cm mass medial to the spleen may represent a splenule. Other   etiologies cannot be excluded. Further characterization with multiphasic   contrast enhanced MRI of the abdomen is recommended (to determine whether it   enhances in the pattern similar to that of the spleen). 3. No urolithiasis or hydronephrosis. Small fat containing right inguinal   hernia. No evidence of fat strangulation.                   Echo:  pending     There is evidence for deep venous thrombosis in the left proximal and   mid superficial femoral artery   There is otherwise good compressibility, there is good augmentation,   there is good color flow. Impression   There is evidence for deep venous thrombosis       ALERT:  THIS IS AN ABNORMAL REPORT                       Labs:  Lab Results   Component Value Date    WBC 20.8 06/12/2021    HGB 14.3 06/12/2021    HCT 41.2 06/12/2021    MCV 92.8 06/12/2021    MCH 32.2 06/12/2021    MCHC 34.7 06/12/2021    RDW 11.9 06/12/2021     06/12/2021    MPV 9.5 06/12/2021     Lab Results   Component Value Date     06/12/2021    K 4.2 06/12/2021    K 4.5 06/10/2021    CL 96 06/12/2021    CO2 22 06/12/2021    BUN 26 06/12/2021    CREATININE 1.3 06/12/2021    LABALBU 2.1 06/12/2021    CALCIUM 8.7 06/12/2021    GFRAA >60 06/12/2021    LABGLOM 56 06/12/2021     Lab Results   Component Value Date    PROTIME 14.8 06/10/2021    INR 1.3 06/10/2021     Recent Labs     06/10/21  1111   PROBNP 1,317*     Recent Labs     06/10/21  2019   PROCAL 0.33*     This SmartLink has not been configured with any valid records. Hypercoag work up ordered for non provoked bilateral PE              Thrombophilia panel: pending               Protein C: 80              Protein S: pending               Homocystine: 9.8              DRVVT: weak positive               Antithormbin III: 81              APTT: 54.8              Peripheral blood smear    Micro:  No results for input(s): CULTRESP in the last 72 hours. No results for input(s): LABGRAM in the last 72 hours. No results for input(s): LEGUR in the last 72 hours. No results for input(s): STREPNEUMAGU in the last 72 hours. No results for input(s): LP1UAG in the last 72 hours.        Assessment:    Acute respiratory failure with hypoxia  Acute bilateral PE's with moderate clot burden, DVT left leg-non provoked  RLL pneumonia-CAP vs Pulmonary infarct  Mild hematuria on admission  Splenic mass/splenule  Liver granuloma  Leukocytosis   HTN  Former nicotine dependence in remission  Elevated pro bnp   Mild elevation of troponin      Plan:   Oxygen therapy 2 liters NC keep >92%  Will need ambulatory O2 testing prior to dc. IV heparin infusion for clot stabilization. Will transition to [address] prior to dc.   2 D echo pending to evaluate for RV strain. Mild elevation of troponin  US BLE positive DVT left leg  thrombophilia panel and hypercoagulable work up pending +weakly positive lupus anticoagulant. hematology following. Rocephin and doxy for CAP coverage. Procal 0.33  Continue Toradol Q 6 x 48 hrs  Total for pleuritic right sided chest discomfort  strep pneumo, legionella urine Ag's-pending, trend inflammatory markers sed rate 104, crp 33. wbc decreased 20.8  DVT, GI prophylaxis add protonix  Consider OP PFTs when recovered  Work up splenic mass oncology/hematology following- MRI ordered  Repeat VQ 3 months as OP    This plan of care was reviewed in collaboration with Dr. Mery Isbell  Electronically signed by LISETTE Hanks - CNP on 6/12/2021 at 10:21 AM      I personally saw, examined, and cared for the patient. Labs, medications, radiographs reviewed. I agree with history exam and plans detailed in NP note. Chinedu Anderson M.D.    Pulmonary/Critical Care Medicine

## 2021-06-12 NOTE — PROGRESS NOTES
Chelly Hernandez 1960    SUBJECTIVE:    No acute events overnight   Denies     OBJECTIVE  Physical Exam:  VITALS:  BP (!) 143/91   Pulse 108   Temp 98.6 °F (37 °C) (Oral)   Resp 22   Wt 236 lb 14.4 oz (107.5 kg)   SpO2 94%   BMI 31.26 kg/m²   General: Alert and oriented in no acute distress   HEENT: normocephalic, PERRL, EOMI, oropharynx clear  Neck; Supple without lymphadenopathy   Respiratory: clear to auscultation, diminished bases, SOB improved, on 2L NC. No accessory muscle use   Cardiac: Tachycardic, regular rhythm, pulses intact, no edema   Abdominal: Soft, slightly tender to left lower quadrant, active BS   Extremities;  No clubbing or cyanosis   Skin: NO lesions or rash   Neuro: Alert and oriented x3    DIAGNOSTIC DATA  Labs:    Lab Results   Component Value Date    WBC 24.6 (H) 06/11/2021    HGB 14.4 06/11/2021    HCT 43.3 06/11/2021    MCV 96.0 06/11/2021     06/11/2021     No results found for: CEA  Recent Labs     06/10/21  1111 06/11/21  0417 06/12/21  0521    134 131*   CO2 26 24 22   PHOS  --  2.5  --    BUN 13 14 26*   CREATININE 1.2 1.1 1.3*     No results found for: FERRITIN  Lab Results   Component Value Date    ALT 15 06/12/2021    AST 21 06/12/2021    ALKPHOS 78 06/12/2021    BILITOT 0.6 06/12/2021     Lab Results   Component Value Date    LABALBU 2.1 (L) 06/12/2021         Current Facility-Administered Medications   Medication Dose Route Frequency Provider Last Rate Last Admin    pantoprazole (PROTONIX) tablet 40 mg  40 mg Oral QAM AC Maryfrances Hides, APRN - CNP   40 mg at 06/12/21 0637    ketorolac (TORADOL) injection 30 mg  30 mg Intravenous Q6H PRN Maryfrlisa Hides, APRN - CNP   30 mg at 06/12/21 0031    fluticasone (FLONASE) 50 MCG/ACT nasal spray 2 spray  2 spray Each Nostril Daily Daphne Koch DO   2 spray at 06/11/21 1648    sodium chloride (OCEAN, BABY AYR) 0.65 % nasal spray 1 spray  1 spray Each Nostril PRN Daphne Koch DO        heparin (porcine) injection 8,530 Units  80 Units/kg Intravenous PRN Rodrigo Sheer, DO        heparin (porcine) injection 4,260 Units  40 Units/kg Intravenous PRN Rodrigo Sheer, DO   4,260 Units at 06/12/21 0635    heparin 25,000 units in dextrose 5% 250 mL (premix) infusion  18 Units/kg/hr Intravenous Continuous Enid Rondon, DO 19.2 mL/hr at 06/12/21 0636 18 Units/kg/hr at 06/12/21 0636    sodium chloride flush 0.9 % injection 5-40 mL  5-40 mL Intravenous 2 times per day Grupo Saas, DO   10 mL at 06/11/21 2115    sodium chloride flush 0.9 % injection 5-40 mL  5-40 mL Intravenous PRN Laura Reynolds DO        0.9 % sodium chloride infusion  25 mL Intravenous PRN Grupo Saas, DO        ondansetron (ZOFRAN-ODT) disintegrating tablet 4 mg  4 mg Oral Q8H PRN Grupo Omalleys, DO        Or    ondansetron [facility]) injection 4 mg  4 mg Intravenous Q6H PRN Laura Reynolds DO        polyethylene glycol (GLYCOLAX) packet 17 g  17 g Oral Daily PRN Grupo Saas, DO        acetaminophen (TYLENOL) tablet 650 mg  650 mg Oral Q6H PRN Grupo Saas, DO   650 mg at 06/11/21 6234    Or    acetaminophen (TYLENOL) suppository 650 mg  650 mg Rectal Q6H PRN Grupo Saas, DO        perflutren lipid microspheres (DEFINITY) injection 1.65 mg  1.5 mL Intravenous ONCE PRN Grupo Omalleys, DO        cefTRIAXone (ROCEPHIN) 1,000 mg in sterile water 10 mL IV syringe  1,000 mg Intravenous Q24H Laura Reynolds DO   1,000 mg at 06/11/21 1545    doxycycline (VIBRAMYCIN) 100 mg in dextrose 5 % 100 mL IVPB  100 mg Intravenous Q12H Laura Reynolds DO   Stopped at 06/12/21 0602    metoprolol (LOPRESSOR) injection 5 mg  5 mg Intravenous Q6H PRN Grupo Saas, DO             IMPRESSION:  Patient Active Problem List   Diagnosis    Acute pulmonary embolism without acute cor pulmonale [facility])       PLAN:  57year old male PMH of HTN, diverticulitis and former smoker (1ppd x10 years)  that presented to ER 06/10/2021 for SOB with right sided thoracic pain since Monday and hematuria. Denies cough, nausea, vomiting/ Denies recent travel, surgery or immobility. Earl Sor FH of blood clotting disorder or anyone on a blood thinner. Upon arrival patient was tachycardic at 116, hypertensive 162/106, and hypoxic on RA requiring 2L nasal cannula  Labs significant for leukocytosis 20.4, BNP 1317 and troponin 38  CXR with RLL infiltrate  CTA chest with prominate pulmonary emboli bilaterally, more notably in the lower lobes. CT abdomen pelvis with a 4.2 x 3.3 cm mass medial to the spleen.    Blood cultures sent and pending-antibiotics started  UA positive for protein and trace blood      -Continue antibiotics for CAP per primary-rocephin and doxy   Heparin drip started   -Hypercoag work up ordered for non provoked bilateral PE              Thrombophilia panel: pending               Protein C: 80              Protein S: pending               Homocystine: 9.8              DRVVT: weak positive               Antithormbin III: 81              APTT: 54.8>47.8               Peripheral blood smear   JAK2                 -Will repeat DRVVT later  -Transition to a DOAC upon discharge   -Molecular panel negative   -Echo pending   -MRI abdominal for mass tomorrow      Thank you for allowing us to participate in the care of Dino Worrell [id number] [address] [facility]   [phone]

## 2021-06-12 NOTE — PROGRESS NOTES
Hospitalist Progress Note      SYNOPSIS: Patient admitted on 6/10/2021 for shortness of breath secondary to bilateral pulmonary emboli      SUBJECTIVE:    Patient seen and examined  Records reviewed. No new complaints    Stable overnight. No other overnight issues reported. Temp (24hrs), Av.4 °F (36.9 °C), Min:98.1 °F (36.7 °C), Max:98.6 °F (37 °C)    DIET: ADULT DIET; Regular; Low Sodium (2 gm)  CODE: Full Code    Intake/Output Summary (Last 24 hours) at 2021 0908  Last data filed at 2021 0420  Gross per 24 hour   Intake 480 ml   Output 550 ml   Net -70 ml       OBJECTIVE:    BP (!) 143/91   Pulse 108   Temp 98.6 °F (37 °C) (Oral)   Resp 22   Wt 236 lb 14.4 oz (107.5 kg)   SpO2 94%   BMI 31.26 kg/m²     General appearance: No apparent distress, appears stated age and cooperative. HEENT:  Conjunctivae/corneas clear. Neck: Supple. No jugular venous distention. Respiratory: Clear to auscultation bilaterally, normal respiratory effort  Cardiovascular: Regular rate rhythm, normal S1-S2  Abdomen: Soft, nontender, nondistended  Musculoskeletal: No clubbing, cyanosis, no bilateral lower extremity edema. Brisk capillary refill. Skin:  No rashes  on visible skin  Neurologic: awake, alert and following commands     ASSESSMENT:    Acute respiratory failure with hypoxia  Bilateral pulmonary emboli  Right lower lobe pneumonia/community-acquired pneumonia versus pulmonary infarction  Splenic mass  Leukocytosis  Elevated proBNP  Mild elevated troponin     PLAN:    Appreciate pulmonology support. Continue IV heparin and transition to oral anticoagulant. Hemoglobin stable. 2D echo pending to evaluate right ventricular strain  Thrombophilia panel/hypercoagulable work-up with weakly positive lupus anticoagulant. Hematology has been consulted.   MRI abdomen rule out mass  Pulmonary hygiene, Rocephin and doxy for CAP coverage    DISPOSITION: Pending further course; ECHO pending    Medications: REVIEWED DAILY    Infusion Medications    heparin (PORCINE) Infusion 18 Units/kg/hr (06/12/21 0636)    sodium chloride       Scheduled Medications    pantoprazole  40 mg Oral QAM AC    fluticasone  2 spray Each Nostril Daily    sodium chloride flush  5-40 mL Intravenous 2 times per day    cefTRIAXone (ROCEPHIN) IV  1,000 mg Intravenous Q24H    doxycycline (VIBRAMYCIN) IV  100 mg Intravenous Q12H     PRN Meds: ketorolac, sodium chloride, heparin (porcine), heparin (porcine), sodium chloride flush, sodium chloride, ondansetron **OR** ondansetron, polyethylene glycol, acetaminophen **OR** acetaminophen, perflutren lipid microspheres, metoprolol    Labs:     Recent Labs     06/10/21  2018 06/11/21  0417 06/11/21  1707   WBC 23.3* 24.7* 24.6*   HGB 15.6 14.8 14.4   HCT 45.5 43.5 43.3    257 296       Recent Labs     06/10/21  1111 06/11/21  0417 06/12/21  0521    134 131*   K 4.5 3.8 4.2   CL 97* 97* 96*   CO2 26 24 22   BUN 13 14 26*   CREATININE 1.2 1.1 1.3*   CALCIUM 9.5 9.0 8.7   PHOS  --  2.5  --        Recent Labs     06/10/21  1111 06/11/21  0417 06/12/21  0521   PROT 8.0 7.4 6.8   ALKPHOS 88 80 78   ALT 14 12 15   AST 14 13 21   BILITOT 1.2 1.0 0.6       Recent Labs     06/10/21  1111   INR 1.3       No results for input(s): CKTOTAL, TROPONINI in the last 72 hours. Chronic labs:    Lab Results   Component Value Date    CHOL 126 06/11/2021    TRIG 72 06/11/2021    HDL 35 06/11/2021    LDLCALC 77 06/11/2021    TSH 0.936 06/11/2021    INR 1.3 06/10/2021    LABA1C 5.4 06/11/2021       Radiology: REVIEWED DAILY    +++++++++++++++++++++++++++++++++++++++++++++++++  Rosa Maria Eugene MD  [facility] - [address] [place] [address]  +++++++++++++++++++++++++++++++++++++++++++++++++  NOTE: This report was transcribed using voice recognition software.  Every effort was made to ensure accuracy; however, inadvertent computerized transcription errors may be present.

## 2021-06-12 NOTE — PLAN OF CARE
Problem: Falls - Risk of:  Goal: Will remain free from falls  Description: Will remain free from falls  6/11/2021 2323 by Anya Gamino RN  Outcome: Ongoing  6/11/2021 2323 by Anya Gamino RN  Outcome: Ongoing  Goal: Absence of physical injury  Description: Absence of physical injury  6/11/2021 2323 by Anya Gamino RN  Outcome: Ongoing  6/11/2021 2323 by Anya Gamino RN  Outcome: Ongoing     Problem: Falls - Risk of:  Goal: Absence of physical injury  Description: Absence of physical injury  6/11/2021 2323 by Anya Gamino RN  Outcome: Ongoing  6/11/2021 2323 by Anya Gamino RN  Outcome: Ongoing

## 2021-06-13 LAB
ALBUMIN SERPL-MCNC: 2.8 G/DL (ref 3.5–5.2)
ALP BLD-CCNC: 82 U/L (ref 40–129)
ALT SERPL-CCNC: 29 U/L (ref 0–40)
ANION GAP SERPL CALCULATED.3IONS-SCNC: 12 MMOL/L (ref 7–16)
APTT: 51.2 SEC (ref 24.5–35.1)
AST SERPL-CCNC: 33 U/L (ref 0–39)
BILIRUB SERPL-MCNC: 0.5 MG/DL (ref 0–1.2)
BUN BLDV-MCNC: 21 MG/DL (ref 6–23)
CALCIUM SERPL-MCNC: 9.3 MG/DL (ref 8.6–10.2)
CHLORIDE BLD-SCNC: 97 MMOL/L (ref 98–107)
CO2: 26 MMOL/L (ref 22–29)
CREAT SERPL-MCNC: 1.2 MG/DL (ref 0.7–1.2)
GFR AFRICAN AMERICAN: >60
GFR NON-AFRICAN AMERICAN: >60 ML/MIN/1.73
GLUCOSE BLD-MCNC: 127 MG/DL (ref 74–99)
HCT VFR BLD CALC: 41.5 % (ref 37–54)
HEMOGLOBIN: 13.9 G/DL (ref 12.5–16.5)
LUPUS ANTICOAG DVVT: NORMAL
MCH RBC QN AUTO: 32 PG (ref 26–35)
MCHC RBC AUTO-ENTMCNC: 33.5 % (ref 32–34.5)
MCV RBC AUTO: 95.6 FL (ref 80–99.9)
PDW BLD-RTO: 11.9 FL (ref 11.5–15)
PLATELET # BLD: 333 E9/L (ref 130–450)
PMV BLD AUTO: 9.7 FL (ref 7–12)
POTASSIUM SERPL-SCNC: 3.4 MMOL/L (ref 3.5–5)
RBC # BLD: 4.34 E12/L (ref 3.8–5.8)
SODIUM BLD-SCNC: 135 MMOL/L (ref 132–146)
TOTAL PROTEIN: 7.2 G/DL (ref 6.4–8.3)
WBC # BLD: 20.4 E9/L (ref 4.5–11.5)

## 2021-06-13 PROCEDURE — 85027 COMPLETE CBC AUTOMATED: CPT

## 2021-06-13 PROCEDURE — 2580000003 HC RX 258: Performed by: INTERNAL MEDICINE

## 2021-06-13 PROCEDURE — 2700000000 HC OXYGEN THERAPY PER DAY

## 2021-06-13 PROCEDURE — 6360000002 HC RX W HCPCS: Performed by: INTERNAL MEDICINE

## 2021-06-13 PROCEDURE — 2060000000 HC ICU INTERMEDIATE R&B

## 2021-06-13 PROCEDURE — 6370000000 HC RX 637 (ALT 250 FOR IP): Performed by: INTERNAL MEDICINE

## 2021-06-13 PROCEDURE — 6370000000 HC RX 637 (ALT 250 FOR IP): Performed by: NURSE PRACTITIONER

## 2021-06-13 PROCEDURE — 6360000002 HC RX W HCPCS: Performed by: NURSE PRACTITIONER

## 2021-06-13 PROCEDURE — 6360000002 HC RX W HCPCS: Performed by: EMERGENCY MEDICINE

## 2021-06-13 PROCEDURE — 2500000003 HC RX 250 WO HCPCS: Performed by: INTERNAL MEDICINE

## 2021-06-13 PROCEDURE — 36415 COLL VENOUS BLD VENIPUNCTURE: CPT

## 2021-06-13 PROCEDURE — 85730 THROMBOPLASTIN TIME PARTIAL: CPT

## 2021-06-13 PROCEDURE — 80053 COMPREHEN METABOLIC PANEL: CPT

## 2021-06-13 RX ADMIN — DOXYCYCLINE 100 MG: 100 INJECTION, POWDER, LYOPHILIZED, FOR SOLUTION INTRAVENOUS at 04:30

## 2021-06-13 RX ADMIN — HEPARIN SODIUM 18 UNITS/KG/HR: 10000 INJECTION, SOLUTION INTRAVENOUS at 14:29

## 2021-06-13 RX ADMIN — HEPARIN SODIUM 18 UNITS/KG/HR: 10000 INJECTION, SOLUTION INTRAVENOUS at 00:57

## 2021-06-13 RX ADMIN — PANTOPRAZOLE SODIUM 40 MG: 40 TABLET, DELAYED RELEASE ORAL at 06:34

## 2021-06-13 RX ADMIN — FLUTICASONE PROPIONATE 2 SPRAY: 50 SPRAY, METERED NASAL at 09:05

## 2021-06-13 RX ADMIN — DOXYCYCLINE 100 MG: 100 INJECTION, POWDER, LYOPHILIZED, FOR SOLUTION INTRAVENOUS at 15:24

## 2021-06-13 RX ADMIN — KETOROLAC TROMETHAMINE 30 MG: 30 INJECTION, SOLUTION INTRAMUSCULAR; INTRAVENOUS at 11:54

## 2021-06-13 RX ADMIN — CEFTRIAXONE 1000 MG: 1 INJECTION, POWDER, FOR SOLUTION INTRAMUSCULAR; INTRAVENOUS at 15:21

## 2021-06-13 ASSESSMENT — PAIN SCALES - GENERAL
PAINLEVEL_OUTOF10: 0
PAINLEVEL_OUTOF10: 4

## 2021-06-13 ASSESSMENT — PAIN DESCRIPTION - LOCATION: LOCATION: RIB CAGE;CHEST

## 2021-06-13 ASSESSMENT — PAIN DESCRIPTION - PROGRESSION: CLINICAL_PROGRESSION: GRADUALLY WORSENING

## 2021-06-13 ASSESSMENT — PAIN - FUNCTIONAL ASSESSMENT: PAIN_FUNCTIONAL_ASSESSMENT: PREVENTS OR INTERFERES SOME ACTIVE ACTIVITIES AND ADLS

## 2021-06-13 ASSESSMENT — PAIN DESCRIPTION - ORIENTATION: ORIENTATION: RIGHT

## 2021-06-13 ASSESSMENT — PAIN DESCRIPTION - FREQUENCY: FREQUENCY: CONTINUOUS

## 2021-06-13 ASSESSMENT — PAIN DESCRIPTION - ONSET: ONSET: ON-GOING

## 2021-06-13 ASSESSMENT — PAIN DESCRIPTION - DESCRIPTORS: DESCRIPTORS: ACHING;DISCOMFORT;DULL

## 2021-06-13 ASSESSMENT — PAIN DESCRIPTION - PAIN TYPE: TYPE: ACUTE PAIN

## 2021-06-13 NOTE — PLAN OF CARE
Problem: Falls - Risk of:  Goal: Will remain free from falls  Description: Will remain free from falls  6/13/2021 1325 by Mark Smith RN  Outcome: Met This Shift  6/13/2021 0145 by Monserrat Vieira RN  Outcome: Ongoing     Problem: Falls - Risk of:  Goal: Absence of physical injury  Description: Absence of physical injury  6/13/2021 1325 by Mark Smith RN  Outcome: Met This Shift  6/13/2021 0145 by Monserrat Vieira RN  Outcome: Ongoing

## 2021-06-13 NOTE — PROGRESS NOTES
Errol Mauricioer 1960    SUBJECTIVE:    NO acute events overnight  MRI tomorrow 06/14/2021    OBJECTIVE  Physical Exam:  VITALS:  /86   Pulse 101   Temp 98.6 °F (37 °C) (Oral)   Resp 18   Wt 236 lb 14.4 oz (107.5 kg)   SpO2 92%   BMI 31.26 kg/m²   General: Alert and oriented in no acute distress   HEENT: normocephalic, PERRL, EOMI, oropharynx clear  Neck; Supple without lymphadenopathy   Respiratory: clear to auscultation, diminished bases, SOB improved, on 2L NC. No accessory muscle use   Cardiac: slightly Tachycardic, regular rhythm, pulses intact, no edema   Abdominal: Soft, slightly tender to left lower quadrant, active BS   Extremities;  No clubbing or cyanosis   Skin: NO lesions or rash   Neuro: Alert and oriented x3    DIAGNOSTIC DATA  Labs:    Lab Results   Component Value Date    WBC 20.4 (H) 06/13/2021    HGB 13.9 06/13/2021    HCT 41.5 06/13/2021    MCV 95.6 06/13/2021     06/13/2021     No results found for: CEA  Recent Labs     06/10/21  1111 06/11/21  0417 06/12/21  0521    134 131*   CO2 26 24 22   PHOS  --  2.5  --    BUN 13 14 26*   CREATININE 1.2 1.1 1.3*     No results found for: FERRITIN  Lab Results   Component Value Date    ALT 15 06/12/2021    AST 21 06/12/2021    ALKPHOS 78 06/12/2021    BILITOT 0.6 06/12/2021     Lab Results   Component Value Date    LABALBU 2.1 (L) 06/12/2021         Current Facility-Administered Medications   Medication Dose Route Frequency Provider Last Rate Last Admin    pantoprazole (PROTONIX) tablet 40 mg  40 mg Oral QAM AC Frankey Clara, APRN - CNP   40 mg at 06/13/21 0634    ketorolac (TORADOL) injection 30 mg  30 mg Intravenous Q6H PRN Frankey Clara, APRN - CNP   30 mg at 06/12/21 1105    fluticasone (FLONASE) 50 MCG/ACT nasal spray 2 spray  2 spray Each Nostril Daily Quan Koch DO   2 spray at 06/13/21 0905    sodium chloride (OCEAN, BABY AYR) 0.65 % nasal spray 1 spray  1 spray Each Nostril PRN Markus Skiff, DO        heparin (porcine) injection 8,530 Units  80 Units/kg Intravenous PRN Pearletha Fauquier, DO        heparin (porcine) injection 4,260 Units  40 Units/kg Intravenous PRN Pearletha Fauquier, DO   4,260 Units at 06/12/21 0635    heparin 25,000 units in dextrose 5% 250 mL (premix) infusion  18 Units/kg/hr Intravenous Continuous Enid Rondon, DO 19.2 mL/hr at 06/13/21 0057 18 Units/kg/hr at 06/13/21 0057    sodium chloride flush 0.9 % injection 5-40 mL  5-40 mL Intravenous 2 times per day [place], DO   10 mL at 06/12/21 1105    sodium chloride flush 0.9 % injection 5-40 mL  5-40 mL Intravenous PRN Manjula Reynolds, DO   10 mL at 06/12/21 1616    0.9 % sodium chloride infusion  25 mL Intravenous PRN Laura Reynolds, DO        ondansetron (ZOFRAN-ODT) disintegrating tablet 4 mg  4 mg Oral Q8H PRN [place], DO        Or    ondansetron [facility]) injection 4 mg  4 mg Intravenous Q6H PRN Laura Reynolds, DO        polyethylene glycol (GLYCOLAX) packet 17 g  17 g Oral Daily PRN [place], DO        acetaminophen (TYLENOL) tablet 650 mg  650 mg Oral Q6H PRN [place], DO   650 mg at 06/11/21 7722    Or    acetaminophen (TYLENOL) suppository 650 mg  650 mg Rectal Q6H PRN [place], DO        perflutren lipid microspheres (DEFINITY) injection 1.65 mg  1.5 mL Intravenous ONCE PRN [place], DO        cefTRIAXone (ROCEPHIN) 1,000 mg in sterile water 10 mL IV syringe  1,000 mg Intravenous Q24H Laura Reynolds DO   1,000 mg at 06/12/21 1615    doxycycline (VIBRAMYCIN) 100 mg in dextrose 5 % 100 mL IVPB  100 mg Intravenous Q12H Laura Reynolds DO   Stopped at 06/13/21 0530    metoprolol (LOPRESSOR) injection 5 mg  5 mg Intravenous Q6H PRN [place], DO             IMPRESSION:  Patient Active Problem List   Diagnosis    Acute pulmonary embolism without acute cor pulmonale (HCC)       PLAN:  57year old male PMH of HTN, diverticulitis and former smoker (1ppd x10 years)  that presented to ER 06/10/2021 for SOB with right sided thoracic pain since Monday and hematuria. Denies cough, nausea, vomiting/ Denies recent travel, surgery or immobility. Lana Melo FH of blood clotting disorder or anyone on a blood thinner. Upon arrival patient was tachycardic at 116, hypertensive 162/106, and hypoxic on RA requiring 2L nasal cannula  Labs significant for leukocytosis 20.4, BNP 1317 and troponin 38  CXR with RLL infiltrate  CTA chest with prominate pulmonary emboli bilaterally, more notably in the lower lobes. CT abdomen pelvis with a 4.2 x 3.3 cm mass medial to the spleen.    Blood cultures sent and pending-antibiotics started  UA positive for protein and trace blood     US lower extremities: DVT in left proximal and mid superficial femoral artery.      -Continue antibiotics for CAP per primary-rocephin and doxy   Heparin drip started   -Hypercoag work up ordered for non provoked bilateral PE              Thrombophilia panel: pending               Protein C: 80              Protein S: pending               Homocystine: 9.8              DRVVT: weak positive               Antithormbin III: 81              APTT: 54.8>47.8               Peripheral blood smear: pending    JAK2:                 -Will repeat DRVVT later  -Transition to a DOAC upon discharge   -Molecular panel negative   -Echo pending   -MRI abdominal for mass Monday      Thank you for allowing us to participate in the care of Jamari Goyal [id number] [facility]   [phone]

## 2021-06-13 NOTE — PROGRESS NOTES
Hospitalist Progress Note      SYNOPSIS: Patient admitted on 6/10/2021 for shortness of breath secondary to bilateral pulmonary emboli      SUBJECTIVE:    Patient seen and examined  Records reviewed. No new complaints  Pretty winded with minimal exertions    Stable overnight. No other overnight issues reported. Temp (24hrs), Av.4 °F (36.9 °C), Min:98 °F (36.7 °C), Max:98.9 °F (37.2 °C)    DIET: ADULT DIET; Regular; Low Sodium (2 gm)  Diet NPO  CODE: Full Code    Intake/Output Summary (Last 24 hours) at 2021 1012  Last data filed at 2021 0906  Gross per 24 hour   Intake --   Output 900 ml   Net -900 ml       OBJECTIVE:    /86   Pulse 101   Temp 98.6 °F (37 °C) (Oral)   Resp 18   Wt 236 lb 14.4 oz (107.5 kg)   SpO2 92%   BMI 31.26 kg/m²     General appearance: No apparent distress, appears stated age and cooperative. HEENT:  Conjunctivae/corneas clear. Neck: Supple. No jugular venous distention. Respiratory: Clear to auscultation bilaterally, normal respiratory effort  Cardiovascular: Regular rate rhythm, normal S1-S2  Abdomen: Soft, nontender, nondistended  Musculoskeletal: No clubbing, cyanosis, no bilateral lower extremity edema. Brisk capillary refill. Skin:  No rashes  on visible skin  Neurologic: awake, alert and following commands     ASSESSMENT:    Acute respiratory failure with hypoxia  Bilateral pulmonary emboli  Right lower lobe pneumonia/community-acquired pneumonia versus pulmonary infarction  Splenic mass  Leukocytosis  Elevated proBNP  Mild elevated troponin     PLAN:    Appreciate pulmonology support. Continue IV heparin and transition to oral anticoagulant. Hemoglobin stable. 2D echo pending to evaluate right ventricular strain  Thrombophilia panel/hypercoagulable work-up with weakly positive lupus anticoagulant. Hematology has been consulted.   MRI abdomen rule out mass  Pulmonary hygiene, Rocephin and doxy for CAP coverage    DISPOSITION: Pending further

## 2021-06-13 NOTE — PROGRESS NOTES
Hospitalist Progress Note      SYNOPSIS: Patient admitted on 6/10/2021 for shortness of breath secondary to bilateral pulmonary emboli      SUBJECTIVE:    Patient seen and examined  Records reviewed. No new complaints  Improved moods  Got Echo study, read pending      Stable overnight. No other overnight issues reported. Temp (24hrs), Av.4 °F (36.9 °C), Min:98 °F (36.7 °C), Max:98.9 °F (37.2 °C)    DIET: ADULT DIET; Regular; Low Sodium (2 gm)  Diet NPO  CODE: Full Code    Intake/Output Summary (Last 24 hours) at 2021 1345  Last data filed at 2021 1151  Gross per 24 hour   Intake --   Output 1100 ml   Net -1100 ml       OBJECTIVE:    /77   Pulse 94   Temp 98.2 °F (36.8 °C) (Oral)   Resp 19   Wt 236 lb 14.4 oz (107.5 kg)   SpO2 93%   BMI 31.26 kg/m²     General appearance: No apparent distress, appears stated age and cooperative. HEENT:  Conjunctivae/corneas clear. Neck: Supple. No jugular venous distention. Respiratory: Clear to auscultation bilaterally, normal respiratory effort  Cardiovascular: Regular rate rhythm, normal S1-S2  Abdomen: Soft, nontender, nondistended  Musculoskeletal: No clubbing, cyanosis, no bilateral lower extremity edema. Brisk capillary refill. Skin:  No rashes  on visible skin  Neurologic: awake, alert and following commands     ASSESSMENT:    Acute respiratory failure with hypoxia  Bilateral pulmonary emboli  Right lower lobe pneumonia/community-acquired pneumonia versus pulmonary infarction  Splenic mass  Leukocytosis  Elevated proBNP  Mild elevated troponin     PLAN:    Appreciate pulmonology support. Continue IV heparin and transition to oral anticoagulant. Hemoglobin stable. 2D echo pending read this AM  Thrombophilia panel/hypercoagulable work-up with weakly positive lupus anticoagulant. Hematology has been consulted.   MRI abdomen rule out mass  Pulmonary hygiene, Rocephin and doxy for CAP coverage  Echo read pending  PT OT    DISPOSITION: Pending further course; MRI abdomen pending    Medications:  REVIEWED DAILY    Infusion Medications    heparin (PORCINE) Infusion 18 Units/kg/hr (06/13/21 0057)    sodium chloride       Scheduled Medications    pantoprazole  40 mg Oral QAM AC    fluticasone  2 spray Each Nostril Daily    sodium chloride flush  5-40 mL Intravenous 2 times per day    cefTRIAXone (ROCEPHIN) IV  1,000 mg Intravenous Q24H    doxycycline (VIBRAMYCIN) IV  100 mg Intravenous Q12H     PRN Meds: sodium chloride, heparin (porcine), heparin (porcine), sodium chloride flush, sodium chloride, ondansetron **OR** ondansetron, polyethylene glycol, acetaminophen **OR** acetaminophen, perflutren lipid microspheres, metoprolol    Labs:     Recent Labs     06/11/21  1707 06/12/21  1001 06/13/21  0746   WBC 24.6* 20.8* 20.4*   HGB 14.4 14.3 13.9   HCT 43.3 41.2 41.5    275 333       Recent Labs     06/11/21  0417 06/12/21  0521 06/13/21  0746    131* 135   K 3.8 4.2 3.4*   CL 97* 96* 97*   CO2 24 22 26   BUN 14 26* 21   CREATININE 1.1 1.3* 1.2   CALCIUM 9.0 8.7 9.3   PHOS 2.5  --   --        Recent Labs     06/11/21  0417 06/12/21  0521 06/13/21  0746   PROT 7.4 6.8 7.2   ALKPHOS 80 78 82   ALT 12 15 29   AST 13 21 33   BILITOT 1.0 0.6 0.5       No results for input(s): INR in the last 72 hours. No results for input(s): Sonam Brennan in the last 72 hours. Chronic labs:    Lab Results   Component Value Date    CHOL 126 06/11/2021    TRIG 72 06/11/2021    HDL 35 06/11/2021    LDLCALC 77 06/11/2021    TSH 0.936 06/11/2021    INR 1.3 06/10/2021    LABA1C 5.4 06/11/2021       Radiology: REVIEWED DAILY    +++++++++++++++++++++++++++++++++++++++++++++++++  Abdirizak Godinez MD  [facility] Physician - [address] [place] [address]  +++++++++++++++++++++++++++++++++++++++++++++++++  NOTE: This report was transcribed using voice recognition software.  Every effort was made to ensure accuracy; however, inadvertent computerized transcription errors may be present.

## 2021-06-13 NOTE — PROGRESS NOTES
proximal and   mid superficial femoral artery   There is otherwise good compressibility, there is good augmentation,   there is good color flow. Impression   There is evidence for deep venous thrombosis       ALERT:  THIS IS AN ABNORMAL REPORT                       Labs:  Lab Results   Component Value Date    WBC 20.4 06/13/2021    HGB 13.9 06/13/2021    HCT 41.5 06/13/2021    MCV 95.6 06/13/2021    MCH 32.0 06/13/2021    MCHC 33.5 06/13/2021    RDW 11.9 06/13/2021     06/13/2021    MPV 9.7 06/13/2021     Lab Results   Component Value Date     06/13/2021    K 3.4 06/13/2021    K 4.5 06/10/2021    CL 97 06/13/2021    CO2 26 06/13/2021    BUN 21 06/13/2021    CREATININE 1.2 06/13/2021    LABALBU 2.8 06/13/2021    CALCIUM 9.3 06/13/2021    GFRAA >60 06/13/2021    LABGLOM >60 06/13/2021     Lab Results   Component Value Date    PROTIME 14.8 06/10/2021    INR 1.3 06/10/2021     No results for input(s): PROBNP in the last 72 hours. Recent Labs     06/10/21  2019   PROCAL 0.33*     This SmartLink has not been configured with any valid records. Hypercoag work up ordered for non provoked bilateral PE              Thrombophilia panel: pending               Protein C: 80              Protein S: pending               Homocystine: 9.8              DRVVT: weak positive               Antithormbin III: 81              APTT: 54.8              Peripheral blood smear    Micro:  No results for input(s): CULTRESP in the last 72 hours. No results for input(s): LABGRAM in the last 72 hours. No results for input(s): LEGUR in the last 72 hours. Recent Labs     06/11/21  1335   STREPNEUMAGU Presumptive negative- suggests no current or recent  pneumococcal infection.   Infection due to Strep pneumoniae cannot be  ruled out since the antigen present in the sample  may be below the detection limit of the test.  Normal Range:Presumptive Negative       Recent Labs     06/11/21  1335   [id number] Presumptive Negative -suggesting no recent or current infections  with Legionella pneumophila serogroup 1. Infection to Legionella cannot be ruled out since other serogroups  and species may cause infection, antigen may not be present in  early infection, or level of antigen may be below the  detection limit. Normal Range: Presumptive Negative            Assessment:    Acute respiratory failure with hypoxia  Acute bilateral PE's with moderate clot burden, DVT left leg-non provoked  RLL pneumonia-CAP vs Pulmonary infarct  Mild hematuria on admission  Splenic mass/splenule  Liver granuloma  Leukocytosis   HTN  Former nicotine dependence in remission  Elevated pro bnp   Mild elevation of troponin      Plan:   Oxygen therapy 3 liters NC keep >92% wean ox as tolerated  Will need ambulatory O2 testing prior to dc. IV heparin infusion for clot stabilization. Will transition to [address] prior to dc-defer to Hematology service. 2 D echo pending to evaluate for RV strain-pending. Mild elevation of troponin  US BLE positive DVT left leg  thrombophilia panel and hypercoagulable work up pending +weakly positive lupus anticoagulant. hematology following. Rocephin and doxy for CAP coverage. Procal 0.33  Continue Toradol Q 6 x 48 hrs  Total for pleuritic right sided chest discomfort  strep pneumo, legionella urine Ag's-pending, trend inflammatory markers sed rate 104, crp 33. wbc decreased 20.8  DVT, GI prophylaxis add protonix  Consider OP PFTs when recovered  Work up splenic mass oncology/hematology following- MRI ordered- to be completed 6/14/21  Repeat VQ 3 months as OP    This plan of care was reviewed in collaboration with Dr. P[facility]ta [facility]  Electronically signed by LISETTE Lujan CNP on 6/13/2021 at 1:07 PM     I personally saw, examined, and cared for the patient. Labs, medications, radiographs reviewed. I agree with history exam and plans detailed in NP note. Florence Lakhani M.D.    Pulmonary/Critical Care Medicine

## 2021-06-13 NOTE — PROGRESS NOTES
MRI screening completed with patient. MRI notified. Patient already ate breakfast so pt will be NPO at midnight tonight and have the MRI done tomorrow. Patient updated.

## 2021-06-13 NOTE — PROGRESS NOTES
APTT this morning 51.2, per EMAR, no bolus and no change to current infusion rate. Will recheck APTT in 24 hours.

## 2021-06-14 ENCOUNTER — APPOINTMENT (OUTPATIENT)
Dept: MRI IMAGING | Age: 61
DRG: 175 | End: 2021-06-14

## 2021-06-14 LAB
ALBUMIN SERPL-MCNC: 2.6 G/DL (ref 3.5–5.2)
ALP BLD-CCNC: 76 U/L (ref 40–129)
ALT SERPL-CCNC: 40 U/L (ref 0–40)
ANION GAP SERPL CALCULATED.3IONS-SCNC: 10 MMOL/L (ref 7–16)
APTT: 59.4 SEC (ref 24.5–35.1)
AST SERPL-CCNC: 41 U/L (ref 0–39)
BILIRUB SERPL-MCNC: 0.4 MG/DL (ref 0–1.2)
BUN BLDV-MCNC: 23 MG/DL (ref 6–23)
CALCIUM SERPL-MCNC: 9 MG/DL (ref 8.6–10.2)
CHLORIDE BLD-SCNC: 97 MMOL/L (ref 98–107)
CO2: 26 MMOL/L (ref 22–29)
CREAT SERPL-MCNC: 1.2 MG/DL (ref 0.7–1.2)
GFR AFRICAN AMERICAN: >60
GFR NON-AFRICAN AMERICAN: >60 ML/MIN/1.73
GLUCOSE BLD-MCNC: 117 MG/DL (ref 74–99)
HCT VFR BLD CALC: 39.9 % (ref 37–54)
HEMOGLOBIN: 13.2 G/DL (ref 12.5–16.5)
LV EF: 58 %
LVEF MODALITY: NORMAL
MCH RBC QN AUTO: 31.6 PG (ref 26–35)
MCHC RBC AUTO-ENTMCNC: 33.1 % (ref 32–34.5)
MCV RBC AUTO: 95.5 FL (ref 80–99.9)
PATHOLOGIST REVIEW: NORMAL
PDW BLD-RTO: 11.9 FL (ref 11.5–15)
PLATELET # BLD: 322 E9/L (ref 130–450)
PMV BLD AUTO: 9.4 FL (ref 7–12)
POTASSIUM SERPL-SCNC: 3.7 MMOL/L (ref 3.5–5)
PROTEIN S ANTIGEN, FREE: 143 % (ref 74–147)
RBC # BLD: 4.18 E12/L (ref 3.8–5.8)
SODIUM BLD-SCNC: 133 MMOL/L (ref 132–146)
TOTAL PROTEIN: 6.8 G/DL (ref 6.4–8.3)
WBC # BLD: 17.9 E9/L (ref 4.5–11.5)

## 2021-06-14 PROCEDURE — 85027 COMPLETE CBC AUTOMATED: CPT

## 2021-06-14 PROCEDURE — 85730 THROMBOPLASTIN TIME PARTIAL: CPT

## 2021-06-14 PROCEDURE — 93306 TTE W/DOPPLER COMPLETE: CPT

## 2021-06-14 PROCEDURE — 6360000002 HC RX W HCPCS: Performed by: EMERGENCY MEDICINE

## 2021-06-14 PROCEDURE — 81270 JAK2 GENE: CPT

## 2021-06-14 PROCEDURE — 36415 COLL VENOUS BLD VENIPUNCTURE: CPT

## 2021-06-14 PROCEDURE — A9579 GAD-BASE MR CONTRAST NOS,1ML: HCPCS | Performed by: RADIOLOGY

## 2021-06-14 PROCEDURE — 6360000004 HC RX CONTRAST MEDICATION: Performed by: RADIOLOGY

## 2021-06-14 PROCEDURE — 6360000004 HC RX CONTRAST MEDICATION: Performed by: INTERNAL MEDICINE

## 2021-06-14 PROCEDURE — 2500000003 HC RX 250 WO HCPCS: Performed by: INTERNAL MEDICINE

## 2021-06-14 PROCEDURE — 2580000003 HC RX 258: Performed by: INTERNAL MEDICINE

## 2021-06-14 PROCEDURE — 87070 CULTURE OTHR SPECIMN AEROBIC: CPT

## 2021-06-14 PROCEDURE — 2060000000 HC ICU INTERMEDIATE R&B

## 2021-06-14 PROCEDURE — 6360000004 HC RX CONTRAST MEDICATION

## 2021-06-14 PROCEDURE — 97161 PT EVAL LOW COMPLEX 20 MIN: CPT

## 2021-06-14 PROCEDURE — 80053 COMPREHEN METABOLIC PANEL: CPT

## 2021-06-14 PROCEDURE — 6360000002 HC RX W HCPCS: Performed by: INTERNAL MEDICINE

## 2021-06-14 PROCEDURE — 2700000000 HC OXYGEN THERAPY PER DAY

## 2021-06-14 PROCEDURE — 87206 SMEAR FLUORESCENT/ACID STAI: CPT

## 2021-06-14 PROCEDURE — 74183 MRI ABD W/O CNTR FLWD CNTR: CPT

## 2021-06-14 PROCEDURE — 99233 SBSQ HOSP IP/OBS HIGH 50: CPT | Performed by: INTERNAL MEDICINE

## 2021-06-14 RX ADMIN — HEPARIN SODIUM 18 UNITS/KG/HR: 10000 INJECTION, SOLUTION INTRAVENOUS at 03:57

## 2021-06-14 RX ADMIN — Medication 10 ML: at 19:42

## 2021-06-14 RX ADMIN — DOXYCYCLINE 100 MG: 100 INJECTION, POWDER, LYOPHILIZED, FOR SOLUTION INTRAVENOUS at 17:10

## 2021-06-14 RX ADMIN — DOXYCYCLINE 100 MG: 100 INJECTION, POWDER, LYOPHILIZED, FOR SOLUTION INTRAVENOUS at 03:52

## 2021-06-14 RX ADMIN — PERFLUTREN 0.32 MG: 6.52 INJECTION, SUSPENSION INTRAVENOUS at 08:57

## 2021-06-14 RX ADMIN — CEFTRIAXONE 1000 MG: 1 INJECTION, POWDER, FOR SOLUTION INTRAMUSCULAR; INTRAVENOUS at 14:24

## 2021-06-14 RX ADMIN — Medication 10 ML: at 09:24

## 2021-06-14 RX ADMIN — GADOTERIDOL 20 ML: 279.3 INJECTION, SOLUTION INTRAVENOUS at 11:39

## 2021-06-14 RX ADMIN — HEPARIN SODIUM 18 UNITS/KG/HR: 10000 INJECTION, SOLUTION INTRAVENOUS at 17:10

## 2021-06-14 ASSESSMENT — PAIN SCALES - GENERAL
PAINLEVEL_OUTOF10: 0

## 2021-06-14 NOTE — PROGRESS NOTES
Isamar Quinteros 1960    SUBJECTIVE:    No acute events overnight, denies CP, improving SOB    MRI today       OBJECTIVE  Physical Exam:  VITALS:  BP (!) 147/83   Pulse 93   Temp 98.4 °F (36.9 °C) (Oral)   Resp 22   Wt 236 lb 14.4 oz (107.5 kg)   SpO2 94%   BMI 31.26 kg/m²   General: Alert and oriented in no acute distress   HEENT: normocephalic, PERRL, EOMI, oropharynx clear  Neck; Supple without lymphadenopathy   Respiratory: clear to auscultation, diminished bases, SOB improved, on 2L NC. No accessory muscle use   Cardiac: slightly Tachycardic, regular rhythm, pulses intact, no edema   Abdominal: Soft, slightly tender to left lower quadrant, active BS   Extremities;  No clubbing or cyanosis   Skin: NO lesions or rash   Neuro: Alert and oriented x3    DIAGNOSTIC DATA  Labs:    Lab Results   Component Value Date    WBC 17.9 (H) 06/14/2021    HGB 13.2 06/14/2021    HCT 39.9 06/14/2021    MCV 95.5 06/14/2021     06/14/2021     No results found for: CEA  Recent Labs     06/12/21  0521 06/13/21  0746 06/14/21  0438   * 135 133   CO2 22 26 26   BUN 26* 21 23   CREATININE 1.3* 1.2 1.2     No results found for: FERRITIN  Lab Results   Component Value Date    ALT 40 06/14/2021    AST 41 (H) 06/14/2021    ALKPHOS 76 06/14/2021    BILITOT 0.4 06/14/2021     Lab Results   Component Value Date    LABALBU 2.6 (L) 06/14/2021         Current Facility-Administered Medications   Medication Dose Route Frequency Provider Last Rate Last Admin    pantoprazole (PROTONIX) tablet 40 mg  40 mg Oral QAM AC LISETTE Taylor - CNP   40 mg at 06/13/21 0634    fluticasone (FLONASE) 50 MCG/ACT nasal spray 2 spray  2 spray Each Nostril Daily Herbert Koch DO   2 spray at 06/13/21 0905    sodium chloride (OCEAN, BABY AYR) 0.65 % nasal spray 1 spray  1 spray Each Nostril PRN Quan Koch DO        heparin (porcine) injection 8,530 Units  80 Units/kg Intravenous PRN Enid Rondon DO        heparin (porcine) injection 4,260 Units  40 Units/kg Intravenous PRN Willodean Beech, DO   4,260 Units at 06/12/21 0635    heparin 25,000 units in dextrose 5% 250 mL (premix) infusion  18 Units/kg/hr Intravenous Continuous Enid Rondon, DO 19.2 mL/hr at 06/14/21 0357 18 Units/kg/hr at 06/14/21 0357    sodium chloride flush 0.9 % injection 5-40 mL  5-40 mL Intravenous 2 times per day Aniceto Fishman, DO   10 mL at 06/14/21 0063    sodium chloride flush 0.9 % injection 5-40 mL  5-40 mL Intravenous PRN Aniceto Fishman, DO   10 mL at 06/12/21 1616    0.9 % sodium chloride infusion  25 mL Intravenous PRN Laura Reynolds DO        ondansetron (ZOFRAN-ODT) disintegrating tablet 4 mg  4 mg Oral Q8H PRN Aniceto Fishman, DO        Or    ondansetron [facility]) injection 4 mg  4 mg Intravenous Q6H PRN Laura Reynolds DO        polyethylene glycol (GLYCOLAX) packet 17 g  17 g Oral Daily PRN Laura Reynolds DO        acetaminophen (TYLENOL) tablet 650 mg  650 mg Oral Q6H PRN Aniceto Fishman, DO   650 mg at 06/11/21 4214    Or    acetaminophen (TYLENOL) suppository 650 mg  650 mg Rectal Q6H PRN Aniceto Fishman, DO        cefTRIAXone (ROCEPHIN) 1,000 mg in sterile water 10 mL IV syringe  1,000 mg Intravenous Q24H Laura Reynolds DO   1,000 mg at 06/13/21 1521    doxycycline (VIBRAMYCIN) 100 mg in dextrose 5 % 100 mL IVPB  100 mg Intravenous Q12H Aniceto Fishman, DO   Stopped at 06/14/21 0457    metoprolol (LOPRESSOR) injection 5 mg  5 mg Intravenous Q6H PRN Aniceto Fishman, DO         IMPRESSION:  Patient Active Problem List   Diagnosis    Acute pulmonary embolism without acute cor pulmonale [facility])     PLAN:  57year old male PMH of HTN, diverticulitis and former smoker (1ppd x10 years)  that presented to ER 06/10/2021 for SOB with right sided thoracic pain     Denies recent travel, surgery or immobility. Denies FH of blood clotting disorder or anyone on a blood thinner.      Upon arrival patient was tachycardic at 116, hypertensive 162/106, and hypoxic on RA requiring 2L nasal cannula  Labs significant for leukocytosis 20.4, BNP 1317 and troponin 38  CXR with RLL infiltrate    CTA chest with prominate pulmonary emboli bilaterally, more notably in the lower lobes. CT abdomen pelvis with a 4.2 x 3.3 cm mass medial to the spleen. Blood cultures sent and pending-antibiotics started  UA positive for protein and trace blood     US lower extremities: DVT in left proximal and mid superficial femoral      -Continue antibiotics for CAP per primary-rocephin and doxy   Heparin drip started   -Hypercoag work up ordered for unprovoked bilateral PE              Thrombophilia panel: pending               Protein C: 80              Protein S: 143              Homocysteine: 9.8              DRVVT: weak positive, repeat negative               Antithormbin III: 81              APTT: 54.8>47.8               Peripheral blood smear: pending    JAK2: penidng                 -Transition to a DOAC upon discharge   -Echo pending   -MRI abdominal for mass today     [address]   [phone]    The patient was seen and examined, I agree with LISETTE Nash-CNP's H&P, assessment and plan as outlined.    06/14/2021  Yoandy Herrera MD

## 2021-06-14 NOTE — PROGRESS NOTES
Physical Therapy    Physical Therapy Initial Assessment     Name: Errol Blackman  : 1960  MRN: [id number]      Date of Service: 2021    Evaluating PT:  Alen Jeans, PT, DPT  [id number]     Room #:  6734/2997-Q  Diagnosis:  Acute pulmonary embolism without acute cor pulmonale, unspecified pulmonary embolism type ([facility] [address] [I26.99]  PMHx/PSHx:  Acute pulmonary embolism, hypertension  Procedure/Surgery:   Precautions:  Falls, B PE and LLE DVT, O2  Equipment Needs:  n/a    SUBJECTIVE:    Pt lives with son in a 2 story home with no stairs to enter. Bedroom and bathroom are on the 2nd level with 12 stairs and hand rails. Pt ambulated with no AD PTA. OBJECTIVE:   Initial Evaluation  Date: 21 Treatment Short Term/ Long Term   Goals   AM-PAC 6 Clicks      Was pt agreeable to Eval/treatment? yes     Does pt have pain? no     Bed Mobility  Rolling: NT  Supine to sit: SBA  Sit to supine: NT  Scooting: SBA  Rolling: independent  Supine to sit: independent  Sit to supine: independent  Scooting: independent   Transfers Sit to stand: SBA  Stand to sit: SBA  Stand pivot: SBA  Sit to stand: Independent   Stand to sit: independent  Stand pivot: independent    Ambulation    60 feet with no AD SBA  >400 feet with no AD independent    Stair negotiation: ascended and descended  NT d/t fatigue and SOB with gait   >12 steps with B hand rail Modified Independent      ROM BUE:  Per OT eval  BLE:  WFL     Strength BUE:  Per OT eval   BLE:  Grossly 5/5     Balance Sitting EOB:  SBA  Dynamic Standing:  SBA   Sitting EOB:  Independent   Dynamic Standing:  Independent      Pt is A & O x 4  Sensation:  Pt denies numbness and tingling to extremities  Edema:  Unremarkable     Vitals on 4 L:  SpO2 at EOB: 90%  SpO2 while ambulatin-97%    Therapeutic Exercises:    BLE AROM    Patient education  Pt educated on PT role, safety during functional mobility, and breathing techniques.      Patient response to education:   Pt verbalized understanding Pt demonstrated skill Pt requires further education in this area   yes yes no     ASSESSMENT:    Conditions Requiring Skilled Therapeutic Intervention:    []Decreased strength     []Decreased ROM  [x]Decreased functional mobility  [x]Decreased balance   [x]Decreased endurance   []Decreased posture  []Decreased sensation  []Decreased coordination   []Decreased vision  [x]Decreased safety awareness   []Increased pain       Comments:  Pt received in supine and agreeable to PT evaluation. Vitals monitored during session. Pt did not require assistance to get to EOB. Pt had SOB with getting to EOB. Pt put on pants at EOB and continued to experience SOB. SpO2 was 90% and . Pt agreed to ambulating in the hallway. Pt complained of LLE soreness during ambulation. Pt demonstrated decreased gait velocity and SOB throughout ambulation. Pt requested to return to room d/t fatigue. Pt SpO2 ranged from 94-97%. Pt returned to room and wanted to sit in chair. Pt reported sitting upright in chair felt good. Pt educated on breathing techniques. Pt left in chair with call button in reach, lines attached, and needs met. Treatment:  Patient practiced and was instructed in the following treatment:     Sitting EOB for >5 minutes for upright tolerance, postural awareness and BLE ROM   STS and pivot transfer training - pt educated on proper hand and foot placement, safety and sequencing,  to safely complete sit<>stand and pivot transfers with hands on assistance to complete task safely    Gait training- pt was given verbal and tactile cues to facilitate upright posture during ambulation as well as provided with physical assistance to complete task. Pt's/ family goals   1. Return home with son     Prognosis is good for reaching above PT goals. Patient and or family understand(s) diagnosis, prognosis, and plan of care.   yes    PHYSICAL THERAPY PLAN OF CARE:    PT POC is established based on Neuromuscular reeducation 10945 -- minutes   Gloria Salgado, SPT  Ricardo Bowman, PT, DPT  [id number]

## 2021-06-14 NOTE — PLAN OF CARE
Problem: Falls - Risk of:  Goal: Will remain free from falls  Description: Will remain free from falls  6/13/2021 2130 by Bob Cerna RN  Outcome: Ongoing  6/13/2021 1325 by Rosio Davila RN  Outcome: Met This Shift  Goal: Absence of physical injury  Description: Absence of physical injury  6/13/2021 2130 by Bob Cerna RN  Outcome: Ongoing  6/13/2021 1325 by Rosio Davila RN  Outcome: Met This Shift     Problem:  Activity Intolerance:  Goal: Ability to tolerate increased activity will improve  Description: Ability to tolerate increased activity will improve  Outcome: Ongoing     Problem: Breathing Pattern - Ineffective:  Goal: Ability to achieve and maintain a regular respiratory rate will improve  Description: Ability to achieve and maintain a regular respiratory rate will improve  Outcome: Ongoing     Problem: Pain:  Goal: Pain level will decrease  Description: Pain level will decrease  Outcome: Ongoing  Goal: Control of acute pain  Description: Control of acute pain  Outcome: Ongoing  Goal: Control of chronic pain  Description: Control of chronic pain  Outcome: Ongoing

## 2021-06-14 NOTE — PROGRESS NOTES
-56 ml       Vent Information  SpO2: 94 %                CURRENT MEDS :  Scheduled Meds:   pantoprazole  40 mg Oral QAM AC    fluticasone  2 spray Each Nostril Daily    sodium chloride flush  5-40 mL Intravenous 2 times per day    cefTRIAXone (ROCEPHIN) IV  1,000 mg Intravenous Q24H    doxycycline (VIBRAMYCIN) IV  100 mg Intravenous Q12H       Physical Exam:    General: The patient is lying in bed without any acute  distress. Breathing is not labored at rest  HEENT: Pupils are equal round and reactive to light, there are no oral lesions and no post-nasal drip   Neck: supple without adenopathy  Cardiovascular: regular rate and rhythm without murmur or gallop  Respiratory: Clear to auscultation bilaterally without wheezing or crackles. Air entry is symmetric  Abdomen: soft, non-tender, non-distended, normal bowel sounds  Extremities: warm,  no clubbing trace LE edema  Skin: no rash or lesion  Neurologic: CN II-XII grossly intact, no focal deficits  Pertinent/ New Labs and Imaging Studies     Imaging Personally Reviewed:    CTA chest  CTA OF THE CHEST:       1. Prominent bilateral pulmonary emboli, worse in the lower lobes. 2. Pulmonary infiltrate in the right lower lobe may represent an   infectious/inflammatory process or infarction. 3. Small right pleural effusion. CT OF THE ABDOMEN AND PELVIS:       1.  No acute abnormality is seen in the abdomen or the pelvis. 2. 4.2 x 3.3 cm mass medial to the spleen may represent a splenule. Other   etiologies cannot be excluded. Further characterization with multiphasic   contrast enhanced MRI of the abdomen is recommended (to determine whether it   enhances in the pattern similar to that of the spleen). 3. No urolithiasis or hydronephrosis. Small fat containing right inguinal   hernia. No evidence of fat strangulation.                   Echo:  pending     There is evidence for deep venous thrombosis in the left proximal and   mid superficial femoral artery There is otherwise good compressibility, there is good augmentation,   there is good color flow. Impression   There is evidence for deep venous thrombosis       ALERT:  THIS IS AN ABNORMAL REPORT                 MRI 6/14/2021  1. A 5.1 cm x 3.5 cm lesion adjacent to the superomedial spleen follows   normal splenic signal on all sequences and is suspected to be in continuity   with the remainder of the spleen.  Given parenchymal irregularity of the   spleen near the hilum, the appearance is felt likely to be a sequela of prior   infarction or trauma.  Alternatively, the lesion could represent a large   splenule.  Regardless, no dedicated follow-up is recommended. 2. Persistence of at least small loculated right pleural effusion. Labs:  Lab Results   Component Value Date    WBC 17.9 06/14/2021    HGB 13.2 06/14/2021    HCT 39.9 06/14/2021    MCV 95.5 06/14/2021    MCH 31.6 06/14/2021    MCHC 33.1 06/14/2021    RDW 11.9 06/14/2021     06/14/2021    MPV 9.4 06/14/2021     Lab Results   Component Value Date     06/14/2021    K 3.7 06/14/2021    K 4.5 06/10/2021    CL 97 06/14/2021    CO2 26 06/14/2021    BUN 23 06/14/2021    CREATININE 1.2 06/14/2021    LABALBU 2.6 06/14/2021    CALCIUM 9.0 06/14/2021    GFRAA >60 06/14/2021    LABGLOM >60 06/14/2021     Lab Results   Component Value Date    PROTIME 14.8 06/10/2021    INR 1.3 06/10/2021     No results for input(s): PROBNP in the last 72 hours. No results for input(s): PROCAL in the last 72 hours. This SmartLink has not been configured with any valid records.      Hypercoag work up ordered for non provoked bilateral PE              Thrombophilia panel: pending               Protein C: 80              Protein S: pending               Homocystine: 9.8              DRVVT: weak positive               Antithormbin III: 81              APTT: 54.8              Peripheral blood smear    Micro:  No results for input(s): CULTRESP in the last 72 hours. No results for input(s): LABGRAM in the last 72 hours. No results for input(s): LEGUR in the last 72 hours. No results for input(s): STREPNEUMAGU in the last 72 hours. No results for input(s): LP1UAG in the last 72 hours. Assessment:    1. Acute respiratory failure with hypoxia  2. Acute bilateral PE's with moderate clot burden, DVT left leg-non provoked  3. RLL pneumonia-CAP vs Pulmonary infarct  4. Mild hematuria on admission  5. Splenic mass/splenule  6. Liver granuloma  7. Leukocytosis   8. HTN  9. Former nicotine dependence in remission  10. Elevated pro bnp   11. Mild elevation of troponin      Plan:   1. Oxygen therapy 4 liters NC keep >92% wean ox as tolerated  2. Will need ambulatory O2 testing prior to dc. 3. IV heparin infusion for clot stabilization. Will transition to [address] prior to dc-defer to Hematology service. 4. 2 D echo completed, results pending to evaluate for RV strain-pending. Mild elevation of troponin  5. US BLE positive DVT left leg  6. thrombophilia panel and hypercoagulable work up pending +weakly positive lupus anticoagulant. hematology following. 7. Rocephin and doxy for CAP coverage. Procal 0.33  8. Continue Toradol Q 6 x 48 hrs  Total for pleuritic right sided chest discomfort  9. strep pneumo, legionella urine Ag's-pending, trend inflammatory markers sed rate 104, crp 33. wbc decreased 20.8  10. DVT, GI prophylaxis add protonix  11. Consider OP PFTs when recovered  12. Work up splenic mass oncology/hematology following- MRI completed, see above  13. Repeat VQ 3 months as OP  14. Respiratory culture pending    This plan of care was reviewed in collaboration with Dr. Edelmira Vaca  Electronically signed by LISETTE Carmen - CNP on 6/14/2021 at 5:46 PM       I personally saw, examined, and cared for the patient. Labs, medications, radiographs reviewed. I agree with history exam and plans detailed in NP note.   Lenis Habermann, MD

## 2021-06-15 LAB
ALBUMIN SERPL-MCNC: 2.5 G/DL (ref 3.5–5.2)
ALP BLD-CCNC: 78 U/L (ref 40–129)
ALT SERPL-CCNC: 66 U/L (ref 0–40)
ANION GAP SERPL CALCULATED.3IONS-SCNC: 10 MMOL/L (ref 7–16)
APTT: 54.9 SEC (ref 24.5–35.1)
AST SERPL-CCNC: 71 U/L (ref 0–39)
BILIRUB SERPL-MCNC: 0.4 MG/DL (ref 0–1.2)
BLOOD CULTURE, ROUTINE: NORMAL
BUN BLDV-MCNC: 19 MG/DL (ref 6–23)
CALCIUM SERPL-MCNC: 9 MG/DL (ref 8.6–10.2)
CHLORIDE BLD-SCNC: 98 MMOL/L (ref 98–107)
CO2: 27 MMOL/L (ref 22–29)
CREAT SERPL-MCNC: 1.1 MG/DL (ref 0.7–1.2)
CULTURE, BLOOD 2: NORMAL
GFR AFRICAN AMERICAN: >60
GFR NON-AFRICAN AMERICAN: >60 ML/MIN/1.73
GLUCOSE BLD-MCNC: 125 MG/DL (ref 74–99)
HCT VFR BLD CALC: 37.8 % (ref 37–54)
HEMOGLOBIN: 12.4 G/DL (ref 12.5–16.5)
MCH RBC QN AUTO: 31.4 PG (ref 26–35)
MCHC RBC AUTO-ENTMCNC: 32.8 % (ref 32–34.5)
MCV RBC AUTO: 95.7 FL (ref 80–99.9)
PDW BLD-RTO: 11.9 FL (ref 11.5–15)
PLATELET # BLD: 344 E9/L (ref 130–450)
PMV BLD AUTO: 9.6 FL (ref 7–12)
POTASSIUM SERPL-SCNC: 3.9 MMOL/L (ref 3.5–5)
RBC # BLD: 3.95 E12/L (ref 3.8–5.8)
SODIUM BLD-SCNC: 135 MMOL/L (ref 132–146)
TOTAL PROTEIN: 6.7 G/DL (ref 6.4–8.3)
WBC # BLD: 16.7 E9/L (ref 4.5–11.5)

## 2021-06-15 PROCEDURE — 6360000002 HC RX W HCPCS: Performed by: EMERGENCY MEDICINE

## 2021-06-15 PROCEDURE — 80053 COMPREHEN METABOLIC PANEL: CPT

## 2021-06-15 PROCEDURE — 97165 OT EVAL LOW COMPLEX 30 MIN: CPT

## 2021-06-15 PROCEDURE — 6360000002 HC RX W HCPCS: Performed by: NURSE PRACTITIONER

## 2021-06-15 PROCEDURE — 2580000003 HC RX 258: Performed by: INTERNAL MEDICINE

## 2021-06-15 PROCEDURE — 85027 COMPLETE CBC AUTOMATED: CPT

## 2021-06-15 PROCEDURE — 2060000000 HC ICU INTERMEDIATE R&B

## 2021-06-15 PROCEDURE — 85730 THROMBOPLASTIN TIME PARTIAL: CPT

## 2021-06-15 PROCEDURE — 2500000003 HC RX 250 WO HCPCS: Performed by: NURSE PRACTITIONER

## 2021-06-15 PROCEDURE — 2700000000 HC OXYGEN THERAPY PER DAY

## 2021-06-15 PROCEDURE — 2580000003 HC RX 258: Performed by: NURSE PRACTITIONER

## 2021-06-15 PROCEDURE — 36415 COLL VENOUS BLD VENIPUNCTURE: CPT

## 2021-06-15 PROCEDURE — 97535 SELF CARE MNGMENT TRAINING: CPT

## 2021-06-15 PROCEDURE — 2500000003 HC RX 250 WO HCPCS: Performed by: INTERNAL MEDICINE

## 2021-06-15 PROCEDURE — 99233 SBSQ HOSP IP/OBS HIGH 50: CPT | Performed by: INTERNAL MEDICINE

## 2021-06-15 PROCEDURE — 6370000000 HC RX 637 (ALT 250 FOR IP): Performed by: NURSE PRACTITIONER

## 2021-06-15 RX ADMIN — Medication 10 ML: at 08:44

## 2021-06-15 RX ADMIN — HEPARIN SODIUM 18 UNITS/KG/HR: 10000 INJECTION, SOLUTION INTRAVENOUS at 21:38

## 2021-06-15 RX ADMIN — PANTOPRAZOLE SODIUM 40 MG: 40 TABLET, DELAYED RELEASE ORAL at 06:08

## 2021-06-15 RX ADMIN — Medication 10 ML: at 19:57

## 2021-06-15 RX ADMIN — DOXYCYCLINE 100 MG: 100 INJECTION, POWDER, LYOPHILIZED, FOR SOLUTION INTRAVENOUS at 15:13

## 2021-06-15 RX ADMIN — DOXYCYCLINE 100 MG: 100 INJECTION, POWDER, LYOPHILIZED, FOR SOLUTION INTRAVENOUS at 03:36

## 2021-06-15 RX ADMIN — FLUTICASONE PROPIONATE 2 SPRAY: 50 SPRAY, METERED NASAL at 08:44

## 2021-06-15 RX ADMIN — HEPARIN SODIUM 18 UNITS/KG/HR: 10000 INJECTION, SOLUTION INTRAVENOUS at 06:10

## 2021-06-15 RX ADMIN — CEFTRIAXONE 1000 MG: 1 INJECTION, POWDER, FOR SOLUTION INTRAMUSCULAR; INTRAVENOUS at 15:13

## 2021-06-15 RX ADMIN — SODIUM CHLORIDE, PRESERVATIVE FREE 10 ML: 5 INJECTION INTRAVENOUS at 08:44

## 2021-06-15 ASSESSMENT — PAIN SCALES - GENERAL
PAINLEVEL_OUTOF10: 0

## 2021-06-15 NOTE — PROGRESS NOTES
[address]      Date:6/15/2021                                                  Patient Name: Jonathan James  MRN: [id number]  : 1960  Room: [address] [place] [address]    Evaluating OT: RYAN Coronel, OTR/L  # [id number]    Referring Provider:  Manuela Maddox MD  Specific Provider Orders:  Nathalie Goode and Treat\"21    Diagnosis: DVT L LE  Bilateral Acute Pulmonary Embolism  Hypoxia  Pneumonia RLL     Surgeries this admission: None     Pertinent Medical History: HTN       Precautions:  Fall Risk  4L O2 continuous  Heparin Drip  Low Na Diet    Assessment of current deficits   [x] Functional mobility   [x]ADLs  [] Strength               []Cognition   [x] Functional transfers   [x] IADLs         [x] Safety Awareness   [x]Endurance   [] Fine Coordination              [x] Balance      [] Vision/perception   []Sensation    []Gross Motor Coordination  [] ROM  [] Delirium                   [] Motor Control       OT PLAN OF CARE   OT POC based on physician orders, patient diagnosis and results of clinical assessment    Frequency/Duration 1-3 days/wk for 2 weeks PRN   Specific OT Treatment to include:   * Instruction/training on adapted ADL techniques and AE recommendations to increase functional independence within precautions  * Training on energy conservation strategies, correct breathing pattern and techniques to improve independence/tolerance for self-care routine  * Functional transfer/mobility training/DME recommendations for increased independence, safety, and fall prevention  * Patient/Family education to increase follow through with safety techniques and functional independence  * Recommendation of environmental modifications for increased safety with functional transfers/mobility and ADLs  * Cognitive retraining/development of therapeutic activities to improve problem solving, Edema: None Noted Kb UEs     Comments: Upon arrival, patient was found in semi-supine. She was agreeable to participate in therapeutic ax. No Family present during session. Received permission from RN prior to engaging pt in OT services. At the end of the session, patient was properly positioned in b/s chair. Call light and phone within reach, all lines and tubes intact. Oriented pt to call bell. Made all appropriate Environmental Modifications to facilitate pt's level of IND and safety. All needs met. RN Made aware of pt's position in chair         Overall patient demonstrated decreased independence and safety during completion of ADL/functional transfer/mobility tasks. Pt would benefit from continued skilled OT to increase safety and independence with completion of ADL/IADL tasks for functional independence and quality of life. Treatment: OT treatment provided this date includes:    Instruction/training on safety and adapted techniques for completion of ADLs, use of DME/AD/Adaptive equip:     Instruction/training on safe functional mobility/transfer techniques, use of DME/AD:     Instruction/training on energy conservation techs (EC)/Pursed-Lip Breathing (PLB)/work simplification for completion of ADLs:      Neuromuscular Reeducation to facilitate balance/righting reactions for increased function with ADLs:     Skilled positioning/alignment to maximize Pt's ability to [facility] interact w/ his/her environment, maximize respiratory status   Activity tolerance - Sitting/Standing to improve endurance w/ functional ax    Cognitive retraining -  Cues for safety/safety awareness    Skilled monitoring of Vitals during session and pt's response to tx ax       Consulted RN     Made all appropriate Environmental Modifications to facilitate pt's level of IND and safety.      Recommendations for Continued Participation in OT services during Hospitalization    Pt and/or Family verbalized/demonstrated

## 2021-06-15 NOTE — CARE COORDINATION
Transition of Care-discharge plan remains home with son, remains on 4L NC, has no home oxygen, will require pulse ox testing prior to discharge. Patient is HFA eligible, he currently does not have any medical insurance, PB reached out to him on 6/11 & 6/14. Discharge pending transition to [facility], remains on heparin gtt, and oxygen weaning as tolerated, transition to oral antibiotics if needed, remains on IV Doxy & Rocephin for CAP. CM following.     Lorry Dandy BSN, RN  [facility]   [phone]

## 2021-06-15 NOTE — PROGRESS NOTES
Freddie Felix M.D.,FCCP  Idalia ShelterJERODO., F.A.BERNARD.OLUISA., Vazquez Hyatt M.D. Jeanna Wilkerson M.D., Darlene Rodas M.D. Mayte Cassidy D.O. Daily Pulmonary Progress Note    Patient:  Gunnar Ast 57 y.o. male MRN: [id number]     Date of Service: 6/15/2021      Synopsis     We are following patient for acute pulmonary embolism with moderate clot burden    \"CC\" shortness of breath    Code status: Full      Subjective      Patient was seen and examined. Lying in bed with improved dyspnea. Encouraged to start ambulating. Oxygen on at 4 L nasal cannula. 94% at rest.  +low grade temp morning, denies cough. IV heparin infusion for clot stabilization. Right-sided chest discomfort has improved since completed steroids. Review of Systems:  Constitutional: Denies fever, weight loss, night sweats, and fatigue  Skin: Denies pigmentation, dark lesions, and rashes   HEENT: Denies hearing loss, tinnitus, ear drainage, epistaxis, sore throat, and hoarseness. Cardiovascular: Denies palpitations, chest pain, and chest pressure.   Respiratory: Dyspnea and right-sided chest discomfort improved  Gastrointestinal: Denies nausea, vomiting, poor appetite, diarrhea, heartburn or reflux  Genitourinary: Denies dysuria, frequency, urgency or hematuria  Musculoskeletal: Denies myalgias, muscle weakness, and bone pain  Neurological: Denies dizziness, vertigo, headache, and focal weakness  Psychological: Denies anxiety and depression  Endocrine: Denies heat intolerance and cold intolerance  Hematopoietic/Lymphatic: Denies bleeding problems and blood transfusions    24-hour events:  None    Objective   Vitals: BP (!) 150/79   Pulse 96   Temp 99.1 °F (37.3 °C) (Oral)   Resp 24   Wt 235 lb (106.6 kg)   SpO2 94%   BMI 31.00 kg/m²     I/O:    Intake/Output Summary (Last 24 hours) at 6/15/2021 0930  Last data filed at 6/15/2021 0812  Gross per 24 hour   Intake 553 ml   Output 1475 ml   Net -922 ml Vent Information  SpO2: 94 %                CURRENT MEDS :  Scheduled Meds:   pantoprazole  40 mg Oral QAM AC    fluticasone  2 spray Each Nostril Daily    sodium chloride flush  5-40 mL Intravenous 2 times per day    cefTRIAXone (ROCEPHIN) IV  1,000 mg Intravenous Q24H    doxycycline (VIBRAMYCIN) IV  100 mg Intravenous Q12H       Physical Exam:    General: The patient is lying in bed without any acute  distress. Breathing is not labored at rest  HEENT: Pupils are equal round and reactive to light, there are no oral lesions and no post-nasal drip   Neck: supple without adenopathy  Cardiovascular: regular rate and rhythm without murmur or gallop  Respiratory: Clear to auscultation bilaterally without wheezing or crackles. Air entry is symmetric  Abdomen: soft, non-tender, non-distended, normal bowel sounds  Extremities: warm,  no clubbing trace LE edema  Skin: no rash or lesion  Neurologic: CN II-XII grossly intact, no focal deficits  Pertinent/ New Labs and Imaging Studies     Imaging Personally Reviewed:  MRI abd and pelvis 6/14/21     1. A 5.1 cm x 3.5 cm lesion adjacent to the superomedial spleen follows   normal splenic signal on all sequences and is suspected to be in continuity   with the remainder of the spleen.  Given parenchymal irregularity of the   spleen near the hilum, the appearance is felt likely to be a sequela of prior   infarction or trauma.  Alternatively, the lesion could represent a large   splenule.  Regardless, no dedicated follow-up is recommended. 2. Persistence of at least small loculated right pleural effusion.                 CTA chest  CTA OF THE CHEST:       1. Prominent bilateral pulmonary emboli, worse in the lower lobes. 2. Pulmonary infiltrate in the right lower lobe may represent an   infectious/inflammatory process or infarction. 3. Small right pleural effusion. CT OF THE ABDOMEN AND PELVIS:       1.  No acute abnormality is seen in the abdomen or the pelvis. 2. 4.2 x 3.3 cm mass medial to the spleen may represent a splenule. Other   etiologies cannot be excluded. Further characterization with multiphasic   contrast enhanced MRI of the abdomen is recommended (to determine whether it   enhances in the pattern similar to that of the spleen). 3. No urolithiasis or hydronephrosis. Small fat containing right inguinal   hernia. No evidence of fat strangulation. Echo:   Summary   Micro-bubble contrast injected to enhance left ventricular visualization. Normal left ventricular size and systolic function. Ejection fraction is visually estimated at 55-60%. Normal diastolic function. No regional wall motion abnormalities seen. Mild left ventricular concentric hypertrophy noted. Normal right ventricular size and function. RV/LV ratio < 0.9.   TAPSE 2.1 cm. Unable to estimate RVSP. No significant valvular abnormalities. US BLE   There is evidence for deep venous thrombosis in the left proximal and   mid superficial femoral artery   There is otherwise good compressibility, there is good augmentation,   there is good color flow. Impression   There is evidence for deep venous thrombosis       ALERT:  THIS IS AN ABNORMAL REPORT                 MRI 6/14/2021  1. A 5.1 cm x 3.5 cm lesion adjacent to the superomedial spleen follows   normal splenic signal on all sequences and is suspected to be in continuity   with the remainder of the spleen.  Given parenchymal irregularity of the   spleen near the hilum, the appearance is felt likely to be a sequela of prior   infarction or trauma.  Alternatively, the lesion could represent a large   splenule.  Regardless, no dedicated follow-up is recommended. 2. Persistence of at least small loculated right pleural effusion.            Labs:  Lab Results   Component Value Date    WBC 16.7 06/15/2021    HGB 12.4 06/15/2021    HCT 37.8 06/15/2021    MCV 95.7 06/15/2021    MCH 31.4 06/15/2021 MCHC 32.8 06/15/2021    RDW 11.9 06/15/2021     06/15/2021    MPV 9.6 06/15/2021     Lab Results   Component Value Date     06/15/2021    K 3.9 06/15/2021    K 4.5 06/10/2021    CL 98 06/15/2021    CO2 27 06/15/2021    BUN 19 06/15/2021    CREATININE 1.1 06/15/2021    LABALBU 2.5 06/15/2021    CALCIUM 9.0 06/15/2021    GFRAA >60 06/15/2021    LABGLOM >60 06/15/2021     Lab Results   Component Value Date    PROTIME 14.8 06/10/2021    INR 1.3 06/10/2021     No results for input(s): PROBNP in the last 72 hours. No results for input(s): PROCAL in the last 72 hours. This SmartLink has not been configured with any valid records. Hypercoag work up ordered for non provoked bilateral PE              Thrombophilia panel: pending               Protein C: 80              Protein S: pending               Homocystine: 9.8              DRVVT: weak positive               Antithormbin III: 81              APTT: 54.8              Peripheral blood smear    Micro:  No results for input(s): CULTRESP in the last 72 hours. No results for input(s): LABGRAM in the last 72 hours. No results for input(s): LEGUR in the last 72 hours. No results for input(s): STREPNEUMAGU in the last 72 hours. No results for input(s): LP1UAG in the last 72 hours. 6/14/21 resp culture  Group 6: <25 PMN's/LPF and <25 Epithelial cells/LPF   Few Polymorphonuclear leukocytes   Few Epithelial cells   Moderate Gram negative diplococci   Moderate Gram negative rods   Few Gram positive cocci in pairs   Assessment:    1. Acute respiratory failure with hypoxia  2. Acute bilateral PE's with moderate clot burden, DVT left leg-non provoked  3. RLL pneumonia-CAP vs Pulmonary infarct  4. Mild hematuria on admission  5. Splenic mass/splenule  6. Liver granuloma  7. Leukocytosis   8. HTN  9. Former nicotine dependence in remission  10. Elevated pro bnp   11. Mild elevation of troponin      Plan:   1.  Oxygen therapy 4 liters NC keep >92% wean ox as tolerated  2. Will need ambulatory O2 testing prior to dc. 3. IV heparin infusion for clot stabilization. Transition to [address] prior to dc-defer to Hematology service. 4. 2 D echo completed, no evidence of RV strain  5. US BLE positive DVT left leg  6. thrombophilia panel and hypercoagulable work up pending +weakly positive lupus anticoagulant. hematology following. 7. Rocephin and doxy for CAP coverage x 7 days. Procal 0.33. resp culture 6/14 few PMNs  8. Completed toradol x 2 days. improved pleuritic right sided chest discomfort  9. strep pneumo, legionella urine Ag's-negative, inflammatory markers sed rate 104, crp 33. wbc decreased 16.7  10. DVT, GI prophylaxis- protonix  11. Consider OP PFTs when recovered  12. Work up splenic mass oncology/hematology following- MRI completed-splenule  13. Repeat VQ 3 months as OP message routed for follow up  14. Increase activity , ambulation    This plan of care was reviewed in collaboration with Dr. Vianney Ortiz  Electronically signed by LISETTE Mariscal CNP on 6/15/2021 at 9:28 AM     Addendum:  I personally saw, examined, and cared for the patient. Labs, medications, radiographs reviewed. I agree with history exam and plans detailed in NP note.   Jacob Genao MD

## 2021-06-15 NOTE — PROGRESS NOTES
Debora Perez 1960    SUBJECTIVE:    No acute events overnight, denies CP or swelling  Continued SOB with ambulation  Concerned about lung function after this event     OBJECTIVE  Physical Exam:  VITALS:  BP (!) 150/79   Pulse 96   Temp 99.1 °F (37.3 °C) (Oral)   Resp 24   Wt 235 lb (106.6 kg)   SpO2 94%   BMI 31.00 kg/m²   General: Alert and oriented in no acute distress, reserved, not very interactive   HEENT: normocephalic, PERRL, EOMI, oropharynx clear  Neck; Supple without lymphadenopathy   Respiratory: clear to auscultation, diminished bases, SOB improved, on 4L NC. No accessory muscle use   Cardiac: regular rate and rhythm, pulses intact, no edema   Abdominal: Soft,non tender, active BS   Extremities;  No clubbing or cyanosis   Skin: NO lesions or rash   Neuro: Alert and oriented x3    DIAGNOSTIC DATA  Labs:    Lab Results   Component Value Date    WBC 16.7 (H) 06/15/2021    HGB 12.4 (L) 06/15/2021    HCT 37.8 06/15/2021    MCV 95.7 06/15/2021     06/15/2021     No results found for: CEA  Recent Labs     06/13/21  0746 06/14/21  0438 06/15/21  0408    133 135   CO2 26 26 27   BUN 21 23 19   CREATININE 1.2 1.2 1.1     No results found for: FERRITIN  Lab Results   Component Value Date    ALT 66 (H) 06/15/2021    AST 71 (H) 06/15/2021    ALKPHOS 78 06/15/2021    BILITOT 0.4 06/15/2021     Lab Results   Component Value Date    LABALBU 2.5 (L) 06/15/2021         Current Facility-Administered Medications   Medication Dose Route Frequency Provider Last Rate Last Admin    pantoprazole (PROTONIX) tablet 40 mg  40 mg Oral QAM AC LISETTE Mcbride CNP   40 mg at 06/15/21 0608    fluticasone (FLONASE) 50 MCG/ACT nasal spray 2 spray  2 spray Each Nostril Daily Gómez Koch DO   2 spray at 06/15/21 0844    sodium chloride (OCEAN, BABY AYR) 0.65 % nasal spray 1 spray  1 spray Each Nostril PRN Quan Koch DO        heparin (porcine) injection 8,530 Units  80 Units/kg Intravenous PRN Moira Jose, DO        heparin (porcine) injection 4,260 Units  40 Units/kg Intravenous PRN Moira Jose DO   4,260 Units at 06/12/21 0635    heparin 25,000 units in dextrose 5% 250 mL (premix) infusion  18 Units/kg/hr Intravenous Continuous Enid Rondon DO 19.2 mL/hr at 06/15/21 0610 18 Units/kg/hr at 06/15/21 0610    sodium chloride flush 0.9 % injection 5-40 mL  5-40 mL Intravenous 2 times per day [place], DO   10 mL at 06/15/21 0844    sodium chloride flush 0.9 % injection 5-40 mL  5-40 mL Intravenous PRN Vansheldontu, DO   10 mL at 06/15/21 0844    0.9 % sodium chloride infusion  25 mL Intravenous PRN Laura Reynolds DO        ondansetron (ZOFRAN-ODT) disintegrating tablet 4 mg  4 mg Oral Q8H PRN Clarencetu, DO        Or    ondansetron [facility]) injection 4 mg  4 mg Intravenous Q6H PRN Laura Reynolds,         polyethylene glycol (GLYCOLAX) packet 17 g  17 g Oral Daily PRN Laura Reynolds DO        acetaminophen (TYLENOL) tablet 650 mg  650 mg Oral Q6H PRN [place], DO   650 mg at 06/11/21 9099    Or    acetaminophen (TYLENOL) suppository 650 mg  650 mg Rectal Q6H PRN Vansheldontu, DO        cefTRIAXone (ROCEPHIN) 1,000 mg in sterile water 10 mL IV syringe  1,000 mg Intravenous Q24H Maryanneyoni Teran, APRN - CNP   1,000 mg at 06/14/21 1424    doxycycline (VIBRAMYCIN) 100 mg in dextrose 5 % 100 mL IVPB  100 mg Intravenous Q12H Maryanne Jeffry, APRN - CNP   Stopped at 06/15/21 0436    metoprolol (LOPRESSOR) injection 5 mg  5 mg Intravenous Q6H PRN [place], DO         IMPRESSION:  Patient Active Problem List   Diagnosis    Acute pulmonary embolism without acute cor pulmonale [facility])     PLAN:  57year old male PMH of HTN, diverticulitis and former smoker (1ppd x10 years)  that presented to ER 06/10/2021 for SOB with right sided thoracic pain     Denies recent travel, surgery or immobility. Denies FH of blood clotting disorder or anyone on a blood thinner.

## 2021-06-15 NOTE — PROGRESS NOTES
Hospitalist Progress Note      PCP: No primary care provider on file. Date of Admission: 6/10/2021    Chief Complaint: Shortness of breath chest pain    Hospital Course: 57y.o. year old male  who  has a past medical history of Hypertension. Patient presented to the ED with complaints of shortness of breath and right sided thoracic pain that started after mowing his lawn 4 days prior. He also endorses hematuria. His past medical history is significant for hypertension and smoking though he quit several years ago. He does smoke marijuana and drinks occasionally. He denies any cough, diaphoresis, nausea or vomiting, swelling or pain in the legs, palpitations, chest pain, and recent travel or sedentary periods. He denies a history of blood clots or family history of blood clotting disorders. He denies any sick contacts. He is not vaccinated for Covid.     ER COURSE:  On arrival to ED he was tachycardic, hypertensive, and hypoxic requiring 2 L nasal cannula. Laboratory work-up was significant for elevated BNP-1317, elevated troponin-38, and leukocytosis-20.4. CXR with RLL pneumonia. CT of the chest with pulmonary emboli in the bilateral lower lobes, right lower lobe opacity thought to be infectious versus inflammatory related to infarction.   CT of the abdomen with a tiny granuloma in the right lobe of the liver, splenic mass measuring 4.2 x 3.3 cm, colonic diverticulosis, and a small right inguinal hernia    Subjective: Seen and examined by me personally he is doing clinically better continue on heparin drip and IV antibiotics      Medications:  Reviewed    Infusion Medications    heparin (PORCINE) Infusion 18 Units/kg/hr (06/15/21 0610)    sodium chloride       Scheduled Medications    pantoprazole  40 mg Oral QAM AC    fluticasone  2 spray Each Nostril Daily    sodium chloride flush  5-40 mL Intravenous 2 times per day    cefTRIAXone (ROCEPHIN) IV  1,000 mg Intravenous Q24H    doxycycline (VIBRAMYCIN) IV  100 mg Intravenous Q12H     PRN Meds: sodium chloride, heparin (porcine), heparin (porcine), sodium chloride flush, sodium chloride, ondansetron **OR** ondansetron, polyethylene glycol, acetaminophen **OR** acetaminophen, metoprolol      Intake/Output Summary (Last 24 hours) at 6/15/2021 1835  Last data filed at 6/15/2021 1504  Gross per 24 hour   Intake 562 ml   Output 1275 ml   Net -713 ml       Exam:    BP (!) 154/83   Pulse 89   Temp 99 °F (37.2 °C) (Oral)   Resp 24   Wt 235 lb (106.6 kg)   SpO2 95%   BMI 31.00 kg/m²     General appearance: No apparent distress, appears stated age and cooperative. HEENT: Pupils equal, round, and reactive to light. Conjunctivae/corneas clear. Neck: Supple, with full range of motion. No jugular venous distention. Trachea midline. Respiratory:  Normal respiratory effort. Clear to auscultation, bilaterally without Rales/Wheezes/Rhonchi. Cardiovascular: Regular rate and rhythm with normal S1/S2 without murmurs, rubs or gallops. Abdomen: Soft, non-tender, non-distended with normal bowel sounds. Musculoskeletal: No clubbing, cyanosis or edema bilaterally. Full range of motion without deformity. Skin: Skin color, texture, turgor normal.  No rashes or lesions. Neurologic:  Neurovascularly intact without any focal sensory/motor deficits.  Cranial nerves: II-XII intact, grossly non-focal.  Psychiatric: Alert and oriented, thought content appropriate, normal insight    Labs:   Recent Labs     06/13/21  0746 06/14/21 0438 06/15/21  0408   WBC 20.4* 17.9* 16.7*   HGB 13.9 13.2 12.4*   HCT 41.5 39.9 37.8    322 344     Recent Labs     06/13/21  0746 06/14/21  0438 06/15/21  0408    133 135   K 3.4* 3.7 3.9   CL 97* 97* 98   CO2 26 26 27   BUN 21 23 19   CREATININE 1.2 1.2 1.1   CALCIUM 9.3 9.0 9.0     Recent Labs     06/13/21  0746 06/14/21  0438 06/15/21  0408   AST 33 41* 71*   ALT 29 40 66*   BILITOT 0.5 0.4 0.4   ALKPHOS 82 76 78     No results for input(s): INR in the last 72 hours. No results for input(s): Natty Bash in the last 72 hours. Assessment/Plan:    Active Hospital Problems    Diagnosis Date Noted    Acute pulmonary embolism without acute cor pulmonale (HCC) [I26.99] 06/10/2021     Acute respiratory failure with hypoxia  Bilateral pulmonary emboli  Prominent bilateral pulmonary emboli, worse in the lower lobes. Continue IV heparin and transition to oral anticoagulant. Right lower lobe pneumonia/community-acquired pneumonia continue on IV antibiotics   Pulmonary infiltrate in the right lower lobe may represent an   infectious/inflammatory process or infarction. 3. Small right pleural effusion. splenic mass  4.2 x 3.3 cm mass medial to the spleen may represent a splenule.  Other   etiologies cannot be excluded.  Further characterization with multiphasic   contrast enhanced MRI of the abdomen is recommended (to determine whether it   enhances in the pattern similar to that of the spleen). 3. No urolithiasis or hydronephrosis.  Small fat containing right inguinal   hernia.  No evidence of fat strangulation. Leukocytosis  Secondary to PE and pneumonia continue on IV antibiotics    Elevated proBNP  Mild elevated troponin   secondary to PE  Echocardiogram-Left Ventricle   Micro-bubble contrast injected to enhance left ventricular visualization. Normal left ventricular size and systolic function. Ejection fraction is visually estimated at 55-60%. Normal diastolic function. No regional wall motion abnormalities seen. Mild left ventricular concentric hypertrophy noted. DVT Prophylaxis: Continue heparin for PE  Diet: ADULT DIET; Regular;  Low Sodium (2 gm)  Code Status: Full Code    PT/OT Eval Status: Continue physical therapy    Dispo -home    Aurelia Samuels MD

## 2021-06-16 LAB
ALBUMIN SERPL-MCNC: 2.8 G/DL (ref 3.5–5.2)
ALP BLD-CCNC: 72 U/L (ref 40–129)
ALT SERPL-CCNC: 75 U/L (ref 0–40)
ANION GAP SERPL CALCULATED.3IONS-SCNC: 10 MMOL/L (ref 7–16)
APTT: 63.7 SEC (ref 24.5–35.1)
AST SERPL-CCNC: 64 U/L (ref 0–39)
BILIRUB SERPL-MCNC: 0.4 MG/DL (ref 0–1.2)
BUN BLDV-MCNC: 16 MG/DL (ref 6–23)
CALCIUM SERPL-MCNC: 9.1 MG/DL (ref 8.6–10.2)
CHLORIDE BLD-SCNC: 96 MMOL/L (ref 98–107)
CO2: 27 MMOL/L (ref 22–29)
CREAT SERPL-MCNC: 1.1 MG/DL (ref 0.7–1.2)
CULTURE, RESPIRATORY: NORMAL
D DIMER: 2254 NG/ML DDU
GFR AFRICAN AMERICAN: >60
GFR NON-AFRICAN AMERICAN: >60 ML/MIN/1.73
GLUCOSE BLD-MCNC: 121 MG/DL (ref 74–99)
HCT VFR BLD CALC: 39.3 % (ref 37–54)
HEMOGLOBIN: 12.8 G/DL (ref 12.5–16.5)
INR BLD: 1.5
MCH RBC QN AUTO: 31.4 PG (ref 26–35)
MCHC RBC AUTO-ENTMCNC: 32.6 % (ref 32–34.5)
MCV RBC AUTO: 96.3 FL (ref 80–99.9)
PDW BLD-RTO: 11.9 FL (ref 11.5–15)
PLATELET # BLD: 403 E9/L (ref 130–450)
PMV BLD AUTO: 9.4 FL (ref 7–12)
POTASSIUM SERPL-SCNC: 3.9 MMOL/L (ref 3.5–5)
PROTHROMBIN TIME: 16.3 SEC (ref 9.3–12.4)
RBC # BLD: 4.08 E12/L (ref 3.8–5.8)
SMEAR, RESPIRATORY: NORMAL
SODIUM BLD-SCNC: 133 MMOL/L (ref 132–146)
TOTAL PROTEIN: 7 G/DL (ref 6.4–8.3)
WBC # BLD: 16.5 E9/L (ref 4.5–11.5)

## 2021-06-16 PROCEDURE — 6370000000 HC RX 637 (ALT 250 FOR IP): Performed by: INTERNAL MEDICINE

## 2021-06-16 PROCEDURE — 85027 COMPLETE CBC AUTOMATED: CPT

## 2021-06-16 PROCEDURE — 85730 THROMBOPLASTIN TIME PARTIAL: CPT

## 2021-06-16 PROCEDURE — 2580000003 HC RX 258: Performed by: NURSE PRACTITIONER

## 2021-06-16 PROCEDURE — 36415 COLL VENOUS BLD VENIPUNCTURE: CPT

## 2021-06-16 PROCEDURE — 6370000000 HC RX 637 (ALT 250 FOR IP): Performed by: NURSE PRACTITIONER

## 2021-06-16 PROCEDURE — 86038 ANTINUCLEAR ANTIBODIES: CPT

## 2021-06-16 PROCEDURE — 85378 FIBRIN DEGRADE SEMIQUANT: CPT

## 2021-06-16 PROCEDURE — 2500000003 HC RX 250 WO HCPCS: Performed by: NURSE PRACTITIONER

## 2021-06-16 PROCEDURE — 2060000000 HC ICU INTERMEDIATE R&B

## 2021-06-16 PROCEDURE — 85610 PROTHROMBIN TIME: CPT

## 2021-06-16 PROCEDURE — 80053 COMPREHEN METABOLIC PANEL: CPT

## 2021-06-16 PROCEDURE — 6360000002 HC RX W HCPCS: Performed by: EMERGENCY MEDICINE

## 2021-06-16 PROCEDURE — 2580000003 HC RX 258: Performed by: INTERNAL MEDICINE

## 2021-06-16 PROCEDURE — 99233 SBSQ HOSP IP/OBS HIGH 50: CPT | Performed by: INTERNAL MEDICINE

## 2021-06-16 PROCEDURE — 86146 BETA-2 GLYCOPROTEIN ANTIBODY: CPT

## 2021-06-16 PROCEDURE — 6360000002 HC RX W HCPCS: Performed by: NURSE PRACTITIONER

## 2021-06-16 PROCEDURE — 86147 CARDIOLIPIN ANTIBODY EA IG: CPT

## 2021-06-16 RX ADMIN — ACETAMINOPHEN 650 MG: 325 TABLET ORAL at 09:12

## 2021-06-16 RX ADMIN — DOXYCYCLINE 100 MG: 100 INJECTION, POWDER, LYOPHILIZED, FOR SOLUTION INTRAVENOUS at 03:45

## 2021-06-16 RX ADMIN — HEPARIN SODIUM 18 UNITS/KG/HR: 10000 INJECTION, SOLUTION INTRAVENOUS at 09:20

## 2021-06-16 RX ADMIN — SODIUM CHLORIDE, PRESERVATIVE FREE 10 ML: 5 INJECTION INTRAVENOUS at 15:51

## 2021-06-16 RX ADMIN — CEFTRIAXONE 1000 MG: 1 INJECTION, POWDER, FOR SOLUTION INTRAMUSCULAR; INTRAVENOUS at 15:51

## 2021-06-16 RX ADMIN — PANTOPRAZOLE SODIUM 40 MG: 40 TABLET, DELAYED RELEASE ORAL at 06:43

## 2021-06-16 RX ADMIN — Medication 10 ML: at 09:11

## 2021-06-16 RX ADMIN — DOXYCYCLINE 100 MG: 100 INJECTION, POWDER, LYOPHILIZED, FOR SOLUTION INTRAVENOUS at 15:51

## 2021-06-16 ASSESSMENT — PAIN DESCRIPTION - PROGRESSION
CLINICAL_PROGRESSION: NOT CHANGED
CLINICAL_PROGRESSION: NOT CHANGED

## 2021-06-16 ASSESSMENT — PAIN DESCRIPTION - FREQUENCY
FREQUENCY: CONTINUOUS
FREQUENCY: CONTINUOUS

## 2021-06-16 ASSESSMENT — PAIN DESCRIPTION - DESCRIPTORS
DESCRIPTORS: ACHING;DISCOMFORT;SORE
DESCRIPTORS: ACHING;DISCOMFORT;SORE

## 2021-06-16 ASSESSMENT — PAIN SCALES - GENERAL
PAINLEVEL_OUTOF10: 0
PAINLEVEL_OUTOF10: 0
PAINLEVEL_OUTOF10: 4
PAINLEVEL_OUTOF10: 4
PAINLEVEL_OUTOF10: 0

## 2021-06-16 ASSESSMENT — PAIN DESCRIPTION - LOCATION
LOCATION: KNEE
LOCATION: KNEE

## 2021-06-16 ASSESSMENT — PAIN DESCRIPTION - PAIN TYPE
TYPE: ACUTE PAIN
TYPE: ACUTE PAIN

## 2021-06-16 ASSESSMENT — PAIN DESCRIPTION - ORIENTATION
ORIENTATION: RIGHT
ORIENTATION: RIGHT

## 2021-06-16 ASSESSMENT — PAIN - FUNCTIONAL ASSESSMENT
PAIN_FUNCTIONAL_ASSESSMENT: ACTIVITIES ARE NOT PREVENTED
PAIN_FUNCTIONAL_ASSESSMENT: ACTIVITIES ARE NOT PREVENTED

## 2021-06-16 ASSESSMENT — PAIN DESCRIPTION - ONSET
ONSET: ON-GOING
ONSET: ON-GOING

## 2021-06-16 NOTE — PROGRESS NOTES
Alis Gilmore 1960    SUBJECTIVE:   Okay to transition to [address] today    No acute events overnight, denies CP or swelling  Improved SOB  Concerned about lung function after this event     OBJECTIVE  Physical Exam:  VITALS:  BP (!) 163/87   Pulse 95   Temp 98.6 °F (37 °C) (Oral)   Resp 20   Wt 235 lb (106.6 kg)   SpO2 91%   BMI 31.00 kg/m²   General: Alert and oriented in no acute distress, reserved  HEENT: normocephalic, PERRL, EOMI, oropharynx clear  Neck; Supple without lymphadenopathy   Respiratory: clear to auscultation, diminished bases, SOB improved, on 4L NC. No accessory muscle use   Cardiac: regular rate and rhythm, pulses intact, no edema   Abdominal: Soft,non tender, active BS   Extremities;  No clubbing or cyanosis   Skin: NO lesions or rash   Neuro: Alert and oriented x3    DIAGNOSTIC DATA  Labs:    Lab Results   Component Value Date    WBC 16.5 (H) 06/16/2021    HGB 12.8 06/16/2021    HCT 39.3 06/16/2021    MCV 96.3 06/16/2021     06/16/2021     No results found for: CEA  Recent Labs     06/14/21  0438 06/15/21  0408 06/16/21  0452    135 133   CO2 26 27 27   BUN 23 19 16   CREATININE 1.2 1.1 1.1     No results found for: FERRITIN  Lab Results   Component Value Date    ALT 75 (H) 06/16/2021    AST 64 (H) 06/16/2021    ALKPHOS 72 06/16/2021    BILITOT 0.4 06/16/2021     Lab Results   Component Value Date    LABALBU 2.8 (L) 06/16/2021         Current Facility-Administered Medications   Medication Dose Route Frequency Provider Last Rate Last Admin    pantoprazole (PROTONIX) tablet 40 mg  40 mg Oral QAM AC LISETTE Franks - CNP   40 mg at 06/16/21 0643    fluticasone (FLONASE) 50 MCG/ACT nasal spray 2 spray  2 spray Each Nostril Daily Leighann Koch DO   2 spray at 06/15/21 0844    sodium chloride (OCEAN, BABY AYR) 0.65 % nasal spray 1 spray  1 spray Each Nostril PRN Quan Koch DO        heparin (porcine) injection 8,530 Units  80 Units/kg Intravenous PRN Annie Ferguson, DO        heparin (porcine) injection 4,260 Units  40 Units/kg Intravenous PRN Annie Polancokay, DO   4,260 Units at 06/12/21 0635    heparin 25,000 units in dextrose 5% 250 mL (premix) infusion  18 Units/kg/hr Intravenous Continuous Enid Rondon, DO 19.2 mL/hr at 06/16/21 0920 18 Units/kg/hr at 06/16/21 0920    sodium chloride flush 0.9 % injection 5-40 mL  5-40 mL Intravenous 2 times per day [address] [place], DO   10 mL at 06/16/21 0484    sodium chloride flush 0.9 % injection 5-40 mL  5-40 mL Intravenous  N [place], DO   10 mL at 06/15/21 0844    0.9 % sodium chloride infusion  25 mL Intravenous PRN Laura Reynolds, DO        ondansetron (ZOFRAN-ODT) disintegrating tablet 4 mg  4 mg Oral Q8H  N [place], DO        Or    ondansetron [facility]) injection 4 mg  4 mg Intravenous Q6H PRN Laura Reynolds, DO        polyethylene glycol (GLYCOLAX) packet 17 g  17 g Oral Daily  N [place], DO        acetaminophen (TYLENOL) tablet 650 mg  650 mg Oral Q6H  N [place], DO   650 mg at 06/16/21 5802    Or    acetaminophen (TYLENOL) suppository 650 mg  650 mg Rectal Q6H  N [place], DO        cefTRIAXone (ROCEPHIN) 1,000 mg in sterile water 10 mL IV syringe  1,000 mg Intravenous Q24H Carlosida Antoni, APRN - CNP   1,000 mg at 06/15/21 1513    doxycycline (VIBRAMYCIN) 100 mg in dextrose 5 % 100 mL IVPB  100 mg Intravenous Q12H Bernida Fine, APRN - CNP   Stopped at 06/16/21 0445    metoprolol (LOPRESSOR) injection 5 mg  5 mg Intravenous Q6H  N [place], DO         IMPRESSION:  Patient Active Problem List   Diagnosis    Acute pulmonary embolism without acute cor pulmonale [facility])     PLAN:  57year old male PMH of HTN, diverticulitis and former smoker (1ppd x10 years)  that presented to ER 06/10/2021 for SOB with right sided thoracic pain     Denies recent travel, surgery or immobility. Denies FH of blood clotting disorder or anyone on a blood thinner.      Upon arrival patient was tachycardic at 116, hypertensive 162/106, and hypoxic on RA requiring 2L nasal cannula  Labs significant for leukocytosis 20.4, BNP 1317 and troponin 38    CTA chest with prominate pulmonary emboli bilaterally, more notably in the lower lobes. CT abdomen pelvis with a 4.2 x 3.3 cm mass medial to the spleen. Blood cultures sent and pending-antibiotics started  UA positive for protein and trace blood     US lower extremities: DVT in left proximal and mid superficial femoral      -Continue antibiotics for CAP per primary-rocephin and doxy   Heparin drip started   -Hypercoag work up ordered for unprovoked bilateral PE              Thrombophilia panel: pending               Protein C: 80   (%)              Protein S: 143 (%)              Antithrombin III: 81 (%)              Homocysteine: 9.8 (0-15)              DRVVT: weak positive, repeat DRVVT negative               JAK2 pending                 -Transition to [facility]-discussed with patient   -2d-Echo without RV strain   -MRI abdominal showing large splenule     Xavier Halo APRN-FARIBA  [facility]   [phone]    The patient was seen and examined, I agree with HIRAM Rubio's H&P, assessment and plan as outlined.    06/16/2021  Georgianna Goodell, MD

## 2021-06-16 NOTE — PROGRESS NOTES
Jennifer Mcmillan M.D.,[facility]  Billie Avelar D.O., F.A.C.O.I., Ely Drake M.D. Trudy Hill M.D., Elo Sainz M.D. Freeman Blanca D.O. Daily Pulmonary Progress Note    Patient:  Austin Mo 57 y.o. male MRN: [id number]     Date of Service: 6/16/2021      Synopsis     We are following patient for acute pulmonary embolism with moderate clot burden    \"CC\" shortness of breath    Code status: Full      Subjective      Patient was seen and examined. Lying in bed with improved dyspnea. Encouraged to start ambulating. No chest pain. IV heparin infusion for clot stabilization. Recommend transition to eliquis loading dose. Right-sided chest discomfort has improved since completed steroids. Review of Systems:  Constitutional: Denies fever, weight loss, night sweats, and fatigue  Skin: Denies pigmentation, dark lesions, and rashes   HEENT: Denies hearing loss, tinnitus, ear drainage, epistaxis, sore throat, and hoarseness. Cardiovascular: Denies palpitations, chest pain, and chest pressure.   Respiratory: Dyspnea and right-sided chest discomfort improved  Gastrointestinal: Denies nausea, vomiting, poor appetite, diarrhea, heartburn or reflux  Genitourinary: Denies dysuria, frequency, urgency or hematuria  Musculoskeletal: Denies myalgias, muscle weakness, and bone pain  Neurological: Denies dizziness, vertigo, headache, and focal weakness  Psychological: Denies anxiety and depression  Endocrine: Denies heat intolerance and cold intolerance  Hematopoietic/Lymphatic: Denies bleeding problems and blood transfusions    24-hour events:  None    Objective   Vitals: BP (!) 163/87   Pulse 95   Temp 98.6 °F (37 °C) (Oral)   Resp 20   Wt 235 lb (106.6 kg)   SpO2 91%   BMI 31.00 kg/m²     I/O:    Intake/Output Summary (Last 24 hours) at 6/16/2021 1422  Last data filed at 6/16/2021 1402  Gross per 24 hour   Intake 693 ml   Output 1475 ml   Net -782 ml       Vent Information  SpO2: to the spleen may represent a splenule. Other   etiologies cannot be excluded. Further characterization with multiphasic   contrast enhanced MRI of the abdomen is recommended (to determine whether it   enhances in the pattern similar to that of the spleen). 3. No urolithiasis or hydronephrosis. Small fat containing right inguinal   hernia. No evidence of fat strangulation. Echo:   Summary   Micro-bubble contrast injected to enhance left ventricular visualization. Normal left ventricular size and systolic function. Ejection fraction is visually estimated at 55-60%. Normal diastolic function. No regional wall motion abnormalities seen. Mild left ventricular concentric hypertrophy noted. Normal right ventricular size and function. RV/LV ratio < 0.9.   TAPSE 2.1 cm. Unable to estimate RVSP. No significant valvular abnormalities. US BLE   There is evidence for deep venous thrombosis in the left proximal and   mid superficial femoral artery   There is otherwise good compressibility, there is good augmentation,   there is good color flow. Impression   There is evidence for deep venous thrombosis       ALERT:  THIS IS AN ABNORMAL REPORT                 MRI 6/14/2021  1. A 5.1 cm x 3.5 cm lesion adjacent to the superomedial spleen follows   normal splenic signal on all sequences and is suspected to be in continuity   with the remainder of the spleen.  Given parenchymal irregularity of the   spleen near the hilum, the appearance is felt likely to be a sequela of prior   infarction or trauma.  Alternatively, the lesion could represent a large   splenule.  Regardless, no dedicated follow-up is recommended. 2. Persistence of at least small loculated right pleural effusion.            Labs:  Lab Results   Component Value Date    WBC 16.5 06/16/2021    HGB 12.8 06/16/2021    HCT 39.3 06/16/2021    MCV 96.3 06/16/2021    MCH 31.4 06/16/2021    MCHC 32.6 06/16/2021    RDW 11.9 06/16/2021     06/16/2021    MPV 9.4 06/16/2021     Lab Results   Component Value Date     06/16/2021    K 3.9 06/16/2021    K 4.5 06/10/2021    CL 96 06/16/2021    CO2 27 06/16/2021    BUN 16 06/16/2021    CREATININE 1.1 06/16/2021    LABALBU 2.8 06/16/2021    CALCIUM 9.1 06/16/2021    GFRAA >60 06/16/2021    LABGLOM >60 06/16/2021     Lab Results   Component Value Date    PROTIME 14.8 06/10/2021    INR 1.3 06/10/2021     No results for input(s): PROBNP in the last 72 hours. No results for input(s): PROCAL in the last 72 hours. This SmartLink has not been configured with any valid records. Hypercoag work up ordered for non provoked bilateral PE              Thrombophilia panel: pending               Protein C: 80              Protein S: pending               Homocystine: 9.8              DRVVT: weak positive               Antithormbin III: 81              APTT: 54.8              Peripheral blood smear    Micro:  Recent Labs     06/14/21  0930   CULTRESP Oral Pharyngeal Mary present     No results for input(s): LABGRAM in the last 72 hours. No results for input(s): LEGUR in the last 72 hours. No results for input(s): STREPNEUMAGU in the last 72 hours. No results for input(s): LP1UAG in the last 72 hours. 6/14/21 resp culture  Group 6: <25 PMN's/LPF and <25 Epithelial cells/LPF   Few Polymorphonuclear leukocytes   Few Epithelial cells   Moderate Gram negative diplococci   Moderate Gram negative rods   Few Gram positive cocci in pairs   Assessment:    1. Acute respiratory failure with hypoxia  2. Acute bilateral PE's with moderate clot burden, DVT left leg-non provoked  3. RLL pneumonia-CAP vs Pulmonary infarct  4. Mild hematuria on admission  5. Splenic mass/splenule  6. Liver granuloma  7. Leukocytosis   8. HTN  9. Former nicotine dependence in remission  10. Elevated pro bnp   11. Mild elevation of troponin      Plan:   1. Oxygen weaned off. Saturation >92%  2.  IV heparin infusion for

## 2021-06-16 NOTE — PROGRESS NOTES
Hospitalist Progress Note      PCP: No primary care provider on file. Date of Admission: 6/10/2021    Chief Complaint: Shortness of breath chest pain    Hospital Course: 57y.o. year old male  who  has a past medical history of Hypertension. Patient presented to the ED with complaints of shortness of breath and right sided thoracic pain that started after mowing his lawn 4 days prior. He also endorses hematuria. His past medical history is significant for hypertension and smoking though he quit several years ago. He does smoke marijuana and drinks occasionally. He denies any cough, diaphoresis, nausea or vomiting, swelling or pain in the legs, palpitations, chest pain, and recent travel or sedentary periods. He denies a history of blood clots or family history of blood clotting disorders. He denies any sick contacts. He is not vaccinated for Covid. On admission patient was tachycardic, hypertensive, and hypoxic requiring 2 L nasal cannula. Laboratory work-up was significant for elevated BNP-1317, elevated troponin-38, and leukocytosis-20.4. CXR with RLL pneumonia. CT of the chest with pulmonary emboli in the bilateral lower lobes, right lower lobe opacity thought to be infectious versus inflammatory related to infarction.   CT of the abdomen with a tiny granuloma in the right lobe of the liver, splenic mass measuring 4.2 x 3.3 cm, colonic diverticulosis, and a small right inguinal hernia    Subjective: Seen and examined by me personally he is more sleepy this morning with unchange in leukocytosis, continue on heparin drip and IV antibiotics  Transition to oral anticoagulant per hematology      Medications:  Reviewed    Infusion Medications    heparin (PORCINE) Infusion 18 Units/kg/hr (06/16/21 2020)    sodium chloride       Scheduled Medications    pantoprazole  40 mg Oral QAM AC    fluticasone  2 spray Each Nostril Daily    sodium chloride flush  5-40 mL Intravenous 2 times per day    Micro-bubble contrast injected to enhance left ventricular visualization. Normal left ventricular size and systolic function. Ejection fraction is visually estimated at 55-60%. Normal diastolic function. No regional wall motion abnormalities seen. Mild left ventricular concentric hypertrophy noted. Continued increase in physical activity with PT OT    DVT Prophylaxis: Continue heparin for PE  Diet: ADULT DIET; Regular;  Low Sodium (2 gm)  Code Status: Full Code    PT/OT Eval Status: Continue physical therapy    Dispo -home health care

## 2021-06-17 VITALS
RESPIRATION RATE: 20 BRPM | BODY MASS INDEX: 31 KG/M2 | TEMPERATURE: 98.1 F | DIASTOLIC BLOOD PRESSURE: 80 MMHG | OXYGEN SATURATION: 92 % | HEART RATE: 99 BPM | WEIGHT: 235 LBS | SYSTOLIC BLOOD PRESSURE: 146 MMHG

## 2021-06-17 LAB
ALBUMIN SERPL-MCNC: 1.9 G/DL (ref 3.5–5.2)
ALP BLD-CCNC: 77 U/L (ref 40–129)
ALT SERPL-CCNC: 83 U/L (ref 0–40)
ANION GAP SERPL CALCULATED.3IONS-SCNC: 12 MMOL/L (ref 7–16)
ANTI-NUCLEAR ANTIBODY (ANA): NEGATIVE
AST SERPL-CCNC: 69 U/L (ref 0–39)
BASOPHILS ABSOLUTE: 0.15 E9/L (ref 0–0.2)
BASOPHILS RELATIVE PERCENT: 0.9 % (ref 0–2)
BILIRUB SERPL-MCNC: 0.5 MG/DL (ref 0–1.2)
BUN BLDV-MCNC: 14 MG/DL (ref 6–23)
CALCIUM SERPL-MCNC: 9.1 MG/DL (ref 8.6–10.2)
CHLORIDE BLD-SCNC: 102 MMOL/L (ref 98–107)
CO2: 18 MMOL/L (ref 22–29)
CREAT SERPL-MCNC: 1 MG/DL (ref 0.7–1.2)
EOSINOPHILS ABSOLUTE: 0.29 E9/L (ref 0.05–0.5)
EOSINOPHILS RELATIVE PERCENT: 1.7 % (ref 0–6)
GFR AFRICAN AMERICAN: >60
GFR NON-AFRICAN AMERICAN: >60 ML/MIN/1.73
GLUCOSE BLD-MCNC: 87 MG/DL (ref 74–99)
HCT VFR BLD CALC: 40.3 % (ref 37–54)
HEMOGLOBIN: 13.4 G/DL (ref 12.5–16.5)
LYMPHOCYTES ABSOLUTE: 1.34 E9/L (ref 1.5–4)
LYMPHOCYTES RELATIVE PERCENT: 7.8 % (ref 20–42)
MCH RBC QN AUTO: 31.6 PG (ref 26–35)
MCHC RBC AUTO-ENTMCNC: 33.3 % (ref 32–34.5)
MCV RBC AUTO: 95 FL (ref 80–99.9)
METAMYELOCYTES RELATIVE PERCENT: 2.6 % (ref 0–1)
MONOCYTES ABSOLUTE: 1.18 E9/L (ref 0.1–0.95)
MONOCYTES RELATIVE PERCENT: 6.9 % (ref 2–12)
MYELOCYTE PERCENT: 1.7 % (ref 0–0)
NEUTROPHILS ABSOLUTE: 13.94 E9/L (ref 1.8–7.3)
NEUTROPHILS RELATIVE PERCENT: 78.4 % (ref 43–80)
PDW BLD-RTO: 11.9 FL (ref 11.5–15)
PLATELET # BLD: 469 E9/L (ref 130–450)
PMV BLD AUTO: 9.2 FL (ref 7–12)
POLYCHROMASIA: ABNORMAL
POTASSIUM SERPL-SCNC: 4.2 MMOL/L (ref 3.5–5)
POTASSIUM SERPL-SCNC: 5.6 MMOL/L (ref 3.5–5)
RBC # BLD: 4.24 E12/L (ref 3.8–5.8)
REASON FOR REJECTION: NORMAL
REJECTED TEST: NORMAL
SODIUM BLD-SCNC: 132 MMOL/L (ref 132–146)
THROMBOPHILIA DNA ASSAY: NORMAL
TOTAL PROTEIN: 7.3 G/DL (ref 6.4–8.3)
WBC # BLD: 16.8 E9/L (ref 4.5–11.5)

## 2021-06-17 PROCEDURE — 2700000000 HC OXYGEN THERAPY PER DAY

## 2021-06-17 PROCEDURE — 6370000000 HC RX 637 (ALT 250 FOR IP): Performed by: FAMILY MEDICINE

## 2021-06-17 PROCEDURE — 80053 COMPREHEN METABOLIC PANEL: CPT

## 2021-06-17 PROCEDURE — 6370000000 HC RX 637 (ALT 250 FOR IP): Performed by: INTERNAL MEDICINE

## 2021-06-17 PROCEDURE — 84132 ASSAY OF SERUM POTASSIUM: CPT

## 2021-06-17 PROCEDURE — 2580000003 HC RX 258: Performed by: INTERNAL MEDICINE

## 2021-06-17 PROCEDURE — 85025 COMPLETE CBC W/AUTO DIFF WBC: CPT

## 2021-06-17 PROCEDURE — 97535 SELF CARE MNGMENT TRAINING: CPT

## 2021-06-17 PROCEDURE — 36415 COLL VENOUS BLD VENIPUNCTURE: CPT

## 2021-06-17 PROCEDURE — 6370000000 HC RX 637 (ALT 250 FOR IP): Performed by: NURSE PRACTITIONER

## 2021-06-17 PROCEDURE — 97530 THERAPEUTIC ACTIVITIES: CPT

## 2021-06-17 RX ORDER — DIPHENHYDRAMINE HCL 25 MG
25 TABLET ORAL EVERY 6 HOURS PRN
Status: DISCONTINUED | OUTPATIENT
Start: 2021-06-17 | End: 2021-06-17 | Stop reason: HOSPADM

## 2021-06-17 RX ORDER — SODIUM POLYSTYRENE SULFONATE 15 G/60ML
15 SUSPENSION ORAL; RECTAL ONCE
Status: DISCONTINUED | OUTPATIENT
Start: 2021-06-17 | End: 2021-06-17 | Stop reason: HOSPADM

## 2021-06-17 RX ORDER — FLUTICASONE PROPIONATE 50 MCG
2 SPRAY, SUSPENSION (ML) NASAL DAILY
Qty: 1 BOTTLE | Refills: 3 | Status: SHIPPED | OUTPATIENT
Start: 2021-06-18

## 2021-06-17 RX ORDER — AMOXICILLIN AND CLAVULANATE POTASSIUM 875; 125 MG/1; MG/1
1 TABLET, FILM COATED ORAL 2 TIMES DAILY WITH MEALS
Qty: 20 TABLET | Refills: 0 | Status: SHIPPED | OUTPATIENT
Start: 2021-06-17 | End: 2021-06-27

## 2021-06-17 RX ORDER — PANTOPRAZOLE SODIUM 40 MG/1
40 TABLET, DELAYED RELEASE ORAL
Qty: 30 TABLET | Refills: 3 | Status: SHIPPED | OUTPATIENT
Start: 2021-06-18

## 2021-06-17 RX ORDER — DOXYCYCLINE HYCLATE 100 MG
100 TABLET ORAL 2 TIMES DAILY
Qty: 10 TABLET | Refills: 0 | Status: SHIPPED | OUTPATIENT
Start: 2021-06-17 | End: 2021-06-22

## 2021-06-17 RX ADMIN — FLUTICASONE PROPIONATE 2 SPRAY: 50 SPRAY, METERED NASAL at 09:50

## 2021-06-17 RX ADMIN — APIXABAN 10 MG: 5 TABLET, FILM COATED ORAL at 09:50

## 2021-06-17 RX ADMIN — Medication 10 ML: at 09:50

## 2021-06-17 RX ADMIN — PANTOPRAZOLE SODIUM 40 MG: 40 TABLET, DELAYED RELEASE ORAL at 06:56

## 2021-06-17 RX ADMIN — DIPHENHYDRAMINE HYDROCHLORIDE 25 MG: 25 TABLET ORAL at 01:14

## 2021-06-17 ASSESSMENT — PAIN SCALES - GENERAL
PAINLEVEL_OUTOF10: 0

## 2021-06-17 NOTE — DISCHARGE INSTR - COC
Continuity of Care Form    Patient Name: Fabian Liu   :  1960  MRN:  [id number]    Admit date:  6/10/2021  Discharge date:  ***    Code Status Order: Full Code   Advance Directives:   [facility] Documentation     Date/Time Healthcare Directive Type of Healthcare Directive Copy in [facility] [address] Agent's Name Healthcare Agent's Phone Number    06/10/21 2012  No, patient does not have an advance directive for healthcare treatment -- -- -- -- --          Admitting Physician:  Alyssa Goldstein DO  PCP: No primary care provider on file. Discharging Nurse: [facility] Unit/Room#: 9818/3075-N  Discharging Unit Phone Number: ***    Emergency Contact:   Extended Emergency Contact Information  Primary Emergency Contact: Claire Macario  Address: [address] [place] [address] Phone: [phone]  Mobile Phone: [phone]  Relation: Child    Past Surgical History:  History reviewed. No pertinent surgical history.     Immunization History:   Immunization History   Administered Date(s) Administered    Tdap (Boostrix, Adacel) 2018, 2019       Active Problems:  Patient Active Problem List   Diagnosis Code    Acute pulmonary embolism without acute cor pulmonale (HCC) I26.99       Isolation/Infection:   Isolation          No Isolation        Patient Infection Status     Infection Onset Added Last Indicated Last Indicated By Review Planned Expiration Resolved Resolved By    None active    Resolved    COVID-19 Rule Out 06/10/21 06/10/21 06/10/21 Respiratory Panel, Molecular, with COVID-19 (Restricted: peds pts or suitable admitted adults) (Ordered)   06/10/21 Rule-Out Test Resulted          Nurse Assessment:  Last Vital Signs: BP (!) 146/80   Pulse 99   Temp 98.1 °F (36.7 °C) (Oral)   Resp 20   Wt 235 lb (106.6 kg)   SpO2 92%   BMI 31.00 kg/m²     Last documented pain score (0-10 scale): Pain Level: 0  Last Weight:   Wt Readings from Last 1 Encounters:   06/15/21 235 lb (106.6 kg)     Mental Status:  {IP PT MENTAL STATUS:}    IV Access:  { DANNA IV ACCESS:278583007}    Nursing Mobility/ADLs:  Walking   {CHP DME GAEE:315608014}  Transfer  {CHP DME ZDDU:960854842}  Bathing  {CHP DME SYSU:066955671}  Dressing  {CHP DME WLJE:052697692}  Toileting  {CHP DME ZQRD:308554108}  Feeding  {CHP DME XMNM:840580972}  Med Admin  {P DME UMUR:489889169}  Med Delivery   {[facility] MED Delivery:254607990}    Wound Care Documentation and Therapy:  Wound 19 Finger (Comment which one) (Active)   Number of days: 671        Elimination:  Continence:   · Bowel: {YES / AV:56500}  · Bladder: {YES / HR:57869}  Urinary Catheter: {Urinary Catheter:162698317}   Colostomy/Ileostomy/Ileal Conduit: {YES / CP:46457}       Date of Last BM: ***    Intake/Output Summary (Last 24 hours) at 2021 1112  Last data filed at 2021 0845  Gross per 24 hour   Intake 1227 ml   Output 1875 ml   Net -648 ml     I/O last 3 completed shifts: In: 7632 [P.O.:720;  I.V.:461; IV Piggyback:100]  Out: 1875 [Urine:1875]    Safety Concerns:     [address] Safety Concerns:315792338}    Impairments/Disabilities:      [address] Impairments/Disabilities:175254213}    Nutrition Therapy:  Current Nutrition Therapy:   [address] Diet List:767883398}    Routes of Feeding: {[facility] DME Other Feedings:093325163}  Liquids: {Slp liquid thickness:05715}  Daily Fluid Restriction: {CHP DME Yes amt example:359084763}  Last Modified Barium Swallow with Video (Video Swallowing Test): {Done Not Done NXIQ:702034059}    Treatments at the Time of Hospital Discharge:   Respiratory Treatments: ***  Oxygen Therapy:  {Therapy; copd oxygen:65910}  Ventilator:    {KAVITA SOLIZ Vent UHWV:629270821}    Rehab Therapies: {THERAPEUTIC INTERVENTION:5071131212}  Weight Bearing Status/Restrictions: { MARTÍNEZ Weight Bearin}  Other Medical Equipment (for information only, NOT a DME order): {EQUIPMENT:009893661}  Other Treatments: ***    Patient's personal belongings (please select all that are sent with patient):  {CHP DME Belongings:458117133}    RN SIGNATURE:  {Esignature:306998247}    CASE MANAGEMENT/SOCIAL WORK SECTION    Inpatient Status Date: ***    Readmission Risk Assessment Score:  Readmission Risk              Risk of Unplanned Readmission:  9           Discharging to Facility/ Agency   · Name:   · Address:  · Phone:  · Fax:    Dialysis Facility (if applicable)   · Name:  · Address:  · Dialysis Schedule:  · Phone:  · Fax:    / signature: {Esignature:785423677}    PHYSICIAN SECTION    Prognosis: {Prognosis:0296735044}    Condition at Discharge: [address] [place] [address] Patient Condition:607144635}    Rehab Potential (if transferring to Rehab): {Prognosis:7338964301}    Recommended Labs or Other Treatments After Discharge: ***    Physician Certification: I certify the above information and transfer of Xiang Ruiz  is necessary for the continuing treatment of the diagnosis listed and that he requires {Admit to Appropriate Level of Care:32379} for {GREATER/LESS:048368373} 30 days.      Update Admission H&P: {CHP DME Changes in GVFOU:757149252}    PHYSICIAN SIGNATURE:  {Esignature:990867532}

## 2021-06-17 NOTE — PROGRESS NOTES
Byron Castle M.D.,[facility]  Omid Samuels D.O., F.A.C.O.I., Norbert Gamboa M.D. Laron Perez M.D., Phylicia Schneider M.D. Ruy Kim D.O. Daily Pulmonary Progress Note    Patient:  Joy Mckeon 57 y.o. male MRN: [id number]     Date of Service: 6/17/2021      Synopsis     We are following patient for acute pulmonary embolism with moderate clot burden    \"CC\" shortness of breath    Code status: Full      Subjective      Patient was seen and examined. eliquis started today. Dc heparin gtt. Will need to repeat ambulatory ox testing today, pt was still using O2 4 liters NC this am. No dyspnea. Denies right sided chest pain. Review of Systems:  Constitutional: Denies fever, weight loss, night sweats, and fatigue  Skin: Denies pigmentation, dark lesions, and rashes   HEENT: Denies hearing loss, tinnitus, ear drainage, epistaxis, sore throat, and hoarseness. Cardiovascular: Denies palpitations, chest pain, and chest pressure.   Respiratory: no complaints  Gastrointestinal: Denies nausea, vomiting, poor appetite, diarrhea, heartburn or reflux  Genitourinary: Denies dysuria, frequency, urgency or hematuria  Musculoskeletal: Denies myalgias, muscle weakness, and bone pain  Neurological: Denies dizziness, vertigo, headache, and focal weakness  Psychological: Denies anxiety and depression  Endocrine: Denies heat intolerance and cold intolerance  Hematopoietic/Lymphatic: Denies bleeding problems and blood transfusions    24-hour events:  None    Objective   Vitals: BP (!) 157/85   Pulse 90   Temp 99 °F (37.2 °C) (Oral)   Resp 18   Wt 235 lb (106.6 kg)   SpO2 92%   BMI 31.00 kg/m²     I/O:    Intake/Output Summary (Last 24 hours) at 6/17/2021 0912  Last data filed at 6/17/2021 0615  Gross per 24 hour   Intake 1161 ml   Output 1700 ml   Net -539 ml       Vent Information  SpO2: 92 %                CURRENT MEDS :  Scheduled Meds:   apixaban  10 mg Oral BID    Followed by    Johanna Martinez ON 6/24/2021] apixaban  5 mg Oral BID    sodium polystyrene  15 g Oral Once    pantoprazole  40 mg Oral QAM AC    fluticasone  2 spray Each Nostril Daily    sodium chloride flush  5-40 mL Intravenous 2 times per day       Physical Exam:    General: The patient is lying in bed without any acute  distress. Breathing is not labored at rest  HEENT: Pupils are equal round and reactive to light, there are no oral lesions and no post-nasal drip   Neck: supple without adenopathy  Cardiovascular: regular rate and rhythm without murmur or gallop  Respiratory: Clear to auscultation bilaterally without wheezing or crackles. Air entry is symmetric  Abdomen: soft, non-tender, non-distended, normal bowel sounds  Extremities: warm,  no clubbing trace LE edema  Skin: no rash or lesion  Neurologic: CN II-XII grossly intact, no focal deficits  Pertinent/ New Labs and Imaging Studies     Imaging Personally Reviewed:  MRI abd and pelvis 6/14/21     1. A 5.1 cm x 3.5 cm lesion adjacent to the superomedial spleen follows   normal splenic signal on all sequences and is suspected to be in continuity   with the remainder of the spleen. Given parenchymal irregularity of the   spleen near the hilum, the appearance is felt likely to be a sequela of prior   infarction or trauma. Alternatively, the lesion could represent a large   splenule. Regardless, no dedicated follow-up is recommended. 2. Persistence of at least small loculated right pleural effusion. CTA chest  CTA OF THE CHEST:       1. Prominent bilateral pulmonary emboli, worse in the lower lobes. 2. Pulmonary infiltrate in the right lower lobe may represent an   infectious/inflammatory process or infarction. 3. Small right pleural effusion. CT OF THE ABDOMEN AND PELVIS:       1.  No acute abnormality is seen in the abdomen or the pelvis. 2. 4.2 x 3.3 cm mass medial to the spleen may represent a splenule. Other   etiologies cannot be excluded.   Further Value Date     06/17/2021    K 5.6 06/17/2021    K 4.5 06/10/2021     06/17/2021    CO2 18 06/17/2021    BUN 14 06/17/2021    CREATININE 1.0 06/17/2021    LABALBU 1.9 06/17/2021    CALCIUM 9.1 06/17/2021    GFRAA >60 06/17/2021    LABGLOM >60 06/17/2021     Lab Results   Component Value Date    PROTIME 16.3 06/16/2021    INR 1.5 06/16/2021     No results for input(s): PROBNP in the last 72 hours. No results for input(s): PROCAL in the last 72 hours. This SmartLink has not been configured with any valid records. Hypercoag work up ordered for non provoked bilateral PE              Thrombophilia panel: pending               Protein C: 80              Protein S: pending               Homocystine: 9.8              DRVVT: weak positive               Antithormbin III: 81              APTT: 54.8              Peripheral blood smear    Micro:  Recent Labs     06/14/21  0930   CULTRESP Oral Pharyngeal Mary present     No results for input(s): LABGRAM in the last 72 hours. No results for input(s): LEGUR in the last 72 hours. No results for input(s): STREPNEUMAGU in the last 72 hours. No results for input(s): LP1UAG in the last 72 hours. 6/14/21 resp culture  Group 6: <25 PMN's/LPF and <25 Epithelial cells/LPF   Few Polymorphonuclear leukocytes   Few Epithelial cells   Moderate Gram negative diplococci   Moderate Gram negative rods   Few Gram positive cocci in pairs   Assessment:    Acute respiratory failure with hypoxia  Acute bilateral PE's with moderate clot burden, DVT left leg-non provoked  RLL pneumonia-CAP vs Pulmonary infarct  Mild hematuria on admission  Splenic mass/splenule  Liver granuloma  Leukocytosis   HTN  Former nicotine dependence in remission  Elevated pro bnp   Mild elevation of troponin      Plan:   Check ox sats with exertion today prior to dc to determine if O2 needed for dc. Stop IV heparin. Started on Eliquis today.   2 D echo completed, no evidence of RV strain  US BLE positive DVT left leg  thrombophilia panel and hypercoagulable work up pending +weakly positive lupus anticoagulant. hematology following. Completed Rocephin and doxy for CAP coverage x 7 days. Procal 0.33. resp culture 6/14 few PMNs. DVT, GI prophylaxis- protonix  splenic mass oncology/hematology following- MRI completed-splenule  Ok to dc from pulmonary perspective when ok with all others. Will follow with hematology for further evaluation regarding hypercoagulable work up as OP. This plan of care was reviewed in collaboration with Dr. Orlando Washington  Electronically signed by LISETTE Holt - CNP on 6/17/2021 at 9:12 AM     I personally saw, examined, and cared for the patient. Labs, medications, radiographs reviewed. I agree with history exam and plans detailed in NP note.    Francia Humphrey MD

## 2021-06-17 NOTE — DISCHARGE SUMMARY
Hospital Medicine Discharge Summary    Patient ID: Joanna Conklin      Patient's PCP: No primary care provider on file. Admit Date: 6/10/2021     Discharge Date:   6/17/20221    Admitting Physician: Nir Diaz DO     Discharge Physician: Maci Schmidt MD     Discharge Diagnoses: BL PE        C/Parrish Emmanuel Arriola 1106 Problems    Diagnosis Date Noted    Acute pulmonary embolism without acute cor pulmonale ([facility] [I26.99] 06/10/2021       The patient was seen and examined on day of discharge and this discharge summary is in conjunction with any daily progress note from day of discharge. Hospital Course:   57 y. o. year old [de-identified]  has a past medical history of Hypertension.   Patient presented to the ED with complaints of shortness of breath and right sided thoracic pain that started after mowing his lawn 4 days prior. Roxie Webber also endorses hematuria.  His past medical history is significant for hypertension and smoking though he quit several years ago. Roxie Webber does smoke marijuana and drinks occasionally. Roxie Webber denies any cough, diaphoresis, nausea or vomiting, swelling or pain in the legs, palpitations, chest pain, and recent travel or sedentary periods.  He denies a history of blood clots or family history of blood clotting disorders.  He denies any sick contacts.  He is not vaccinated for Covid.   On admission patient was tachycardic, hypertensive, and hypoxic requiring 2 L nasal cannula.  Laboratory work-up was significant for elevated BNP-1317, elevated troponin-38, and leukocytosis-20.4.->16k CXR with RLL pneumonia. CT of the chest with pulmonary emboli in the bilateral lower lobes, right lower lobe opacity thought to be infectious versus inflammatory related to infarction.  CT of the abdomen with a tiny granuloma in the right lobe of the liver, splenic mass measuring 4.2 x 3.3 cm, colonic diverticulosis, and a small right inguinal hernia      Active Hospital Problems     Diagnosis Date Noted    Acute pulmonary embolism without acute cor pulmonale (HCC) [I26.99] 06/10/2021      Acute respiratory failure with hypoxia  Secondary to bilateral pulmonary emboli and CAP  Prominent bilateral pulmonary emboli, worse in the lower lobes. Continue IV heparin and transition to oral anticoagulant per hematology   Started on Eliquis-continue follow-up with hematology as outpatient    Right lower lobe pneumonia/community-acquired pneumonia continue on IV antibiotics   changed to p.o. Augmentin and doxycycline upon discharge  Pulmonary infiltrate in the right lower lobe may represent an   infectious/inflammatory process or infarction. 3. Small right pleural effusion.         Splenic mass  4.2 x 3.3 cm mass medial to the spleen may represent a splenule.  Other   etiologies cannot be excluded.  Further characterization with multiphasic   contrast enhanced MRI of the abdomen is recommended (to determine whether it   enhances in the pattern similar to that of the spleen). 3. No urolithiasis or hydronephrosis.  Small fat containing right inguinal   hernia.  No evidence of fat strangulation. MRI abdomen showed  Impression   1. A 5.1 cm x 3.5 cm lesion adjacent to the superomedial spleen follows   normal splenic signal on all sequences and is suspected to be in continuity   with the remainder of the spleen.  Given parenchymal irregularity of the   spleen near the hilum, the appearance is felt likely to be a sequela of prior   infarction or trauma.  Alternatively, the lesion could represent a large   splenule.  Regardless, no dedicated follow-up is recommended. 2. Persistence of at least small loculated right pleural effusion.    Follow-up with splenic mass as outpatient     Leukocytosis  Secondary to PE and pneumonia continue on IV antibiotics  Changed to p.o. Eliquis and p.o.antibiotics     Elevated proBNP  Mild elevated troponin   secondary to PE  Echocardiogram-Left Ventricle   Micro-bubble contrast injected to enhance left ventricular visualization.   Normal left ventricular size and systolic function.   Ejection fraction is visually estimated at 55-60%.   Normal diastolic function.   No regional wall motion abnormalities seen.   Mild left ventricular concentric hypertrophy noted.     Patient doing clinically better, he can be discharged home on p.o. Eliquis and antibiotics and follow-up with PCP and hematology as outpatient    Exam:     BP (!) 146/80   Pulse 99   Temp 98.1 °F (36.7 °C) (Oral)   Resp 20   Wt 235 lb (106.6 kg)   SpO2 92%   BMI 31.00 kg/m²     General appearance: No apparent distress, appears stated age and cooperative. HEENT: Pupils equal, round, and reactive to light. Conjunctivae/corneas clear. Neck: Supple, with full range of motion. No jugular venous distention. Trachea midline. Respiratory:  Normal respiratory effort. Clear to auscultation, bilaterally without Rales/Wheezes/Rhonchi. Cardiovascular: Regular rate and rhythm with normal S1/S2 without murmurs, rubs or gallops. Abdomen: Soft, non-tender, non-distended with normal bowel sounds. Musculoskeletal: No clubbing, cyanosis or edema bilaterally. Full range of motion without deformity. Skin: Skin color, texture, turgor normal.  No rashes or lesions. Neurologic:  Neurovascularly intact without any focal sensory/motor deficits. Cranial nerves: II-XII intact, grossly non-focal.  Psychiatric: Alert and oriented, thought content appropriate, normal insight      Consults:     IP CONSULT TO INTERNAL MEDICINE  IP CONSULT TO PULMONOLOGY  IP CONSULT TO ONCOLOGY    Significant Diagnostic Studies:     Impression   CTA OF THE CHEST:       1. Prominent bilateral pulmonary emboli, worse in the lower lobes. 2. Pulmonary infiltrate in the right lower lobe may represent an   infectious/inflammatory process or infarction. 3. Small right pleural effusion. CT OF THE ABDOMEN AND PELVIS:       1.  No acute abnormality is seen in the abdomen or the pelvis.    2. 4.2 x 3.3 cm mass medial to the spleen may represent a splenule.  Other   etiologies cannot be excluded.  Further characterization with multiphasic   contrast enhanced MRI of the abdomen is recommended (to determine whether it   enhances in the pattern similar to that of the spleen). 3. No urolithiasis or hydronephrosis.  Small fat containing right inguinal   hernia.  No evidence of fat strangulation.         MRI abd          Impression   1. A 5.1 cm x 3.5 cm lesion adjacent to the superomedial spleen follows   normal splenic signal on all sequences and is suspected to be in continuity   with the remainder of the spleen.  Given parenchymal irregularity of the   spleen near the hilum, the appearance is felt likely to be a sequela of prior   infarction or trauma.  Alternatively, the lesion could represent a large   splenule.  Regardless, no dedicated follow-up is recommended. 2. Persistence of at least small loculated right pleural effusion.             Disposition:  home    Discharge Instructions/Follow-up:  PCP    Code Status:  Full Code     Activity: activity as tolerated    Diet: regular diet    Labs:  For convenience and continuity at follow-up the following most recent labs are provided:      CBC:    Lab Results   Component Value Date    WBC 16.8 06/17/2021    HGB 13.4 06/17/2021    HCT 40.3 06/17/2021     06/17/2021       Renal:    Lab Results   Component Value Date     06/17/2021    K 5.6 06/17/2021    K 4.5 06/10/2021     06/17/2021    CO2 18 06/17/2021    BUN 14 06/17/2021    CREATININE 1.0 06/17/2021    CALCIUM 9.1 06/17/2021    PHOS 2.5 06/11/2021       Discharge Medications:     Current Discharge Medication List           Details   !! apixaban (ELIQUIS) 5 MG TABS tablet Take 2 tablets by mouth 2 times daily for 7 days  Qty: 28 tablet, Refills: 0      !! apixaban (ELIQUIS) 5 MG TABS tablet Take 1 tablet by mouth 2 times daily  Qty: 60 tablet, Refills: 2      fluticasone (FLONASE) 50 MCG/ACT nasal spray 2 sprays by Each Nostril route daily  Qty: 1 Bottle, Refills: 3      pantoprazole (PROTONIX) 40 MG tablet Take 1 tablet by mouth every morning (before breakfast)  Qty: 30 tablet, Refills: 3       !! - Potential duplicate medications found. Please discuss with provider. Time Spent on discharge is more than 45 minutes in the examination, evaluation, counseling and review of medications and discharge plan. Signed:    Dewey Alves MD   6/17/2021      Thank you No primary care provider on file. for the opportunity to be involved in this patient's care. If you have any questions or concerns please feel free to contact me at [phone].

## 2021-06-17 NOTE — PROGRESS NOTES
CLINICAL PHARMACY NOTE: MEDS TO BEDS    Total # of Prescriptions Filled: 3   The following medications were delivered to the patient:  · Eliquis 5 mg  · Doxycycline 100 mg  · Amoxicillin-Clavul 875-125 mg    Additional Documentation:  meds delivered to patient @12

## 2021-06-17 NOTE — PROGRESS NOTES
Occupational Therapy  OCCUPATIONAL THERAPY BEDSIDE TREATMENT NOTE   [facility] [address] [facility]     Date:2021  Patient Name: Errol Blackman  MRN: [id number]  : 1960  Room: [address] [place] [address]     Evaluating OT: RYAN Velásquez, OTR/L  # [id number]     Referring Provider:  Dallin Daniel MD  Specific Provider Orders:  Yaritza Serene and Treat\"  21     Diagnosis: DVT L LE  Bilateral Acute Pulmonary Embolism  Hypoxia  Pneumonia RLL      Surgeries this admission: None      Pertinent Medical History: HTN        Precautions:  Fall Risk      Low Na Diet     Assessment of current deficits   [x]? Functional mobility                         [x]?ADLs           []? Strength                  []?Cognition   [x]? Functional transfers           [x]? IADLs         [x]? Safety Awareness   [x]? Endurance   []? Fine Coordination              [x]? Balance      []? Vision/perception   []? Sensation     []? Gross Motor Coordination  []? ROM           []?  Delirium                   []? Motor Control         OT PLAN OF CARE   OT POC based on physician orders, patient diagnosis and results of clinical assessment     Frequency/Duration 1-3 days/wk for 2 weeks PRN   Specific OT Treatment to include:   * Instruction/training on adapted ADL techniques and AE recommendations to increase functional independence within precautions  * Training on energy conservation strategies, correct breathing pattern and techniques to improve independence/tolerance for self-care routine  * Functional transfer/mobility training/DME recommendations for increased independence, safety, and fall prevention  * Patient/Family education to increase follow through with safety techniques and functional independence  * Recommendation of environmental modifications for increased safety with functional transfers/mobility and ADLs  * Cognitive retraining/development of therapeutic activities to improve problem solving, judgement, memory, and attention for increased safety/participation in ADL/IADL tasks  * Therapeutic exercise to improve motor endurance, ROM, and functional strength for ADLs/functional transfers  * Therapeutic activities to facilitate/challenge dynamic balance, stand tolerance for increased safety and independence with ADLs  * Therapeutic activities to facilitate gross/fine motor skills for increased independence with ADLs  * Neuro-muscular re-education: facilitation of righting/equilibrium reactions, midline orientation, scapular stability/mobility, normalization of muscle tone, and facilitation of volitional active controled movement  * Positioning to improve skin integrity, interaction with environment and functional independence     Pt was admitted w/ SOB, Right Chest Pain, (+) Hematuria x several days. Incidental Finding of Splenic Mass     Recommended Adaptive Equipment: TBD as pt progresses - Shower chair, adaptive equipment     Home Living:  Pt lives with his Son in a [facility] house. Bed/bath on the 2nd floor, Half bath on 1st.  (+) Basement. Bathroom setup:  Tub-Shower, Standard-height Commode   Equipment owned:  None     Available Family Assist:  Son works full time (4 10-hour shifts)     Prior Level of Function:  Pt was IND with all ADLs, IADLs, Transfers and Mobility using No AD for ambulation. Active  Driving:  Yes -shopping, outings, errands  Occupation:  Just retired 2 weeks ago -      Pain Level:  Denies pain    Additional Complaints:  Mild-Moderate SOB w/ standing ax     Cognition: A & O x 4 to place, date and year. Able to Follow Multi-Step Commands INDly              Memory:  good               Sequencing:  good               Problem solving:  good               Judgement/safety:  fair+  Additional Comments:  Pt was pleasant and cooperative.   Flat affect, Poor eye contact     Vitals/Lab Values:  O2 sats 93% in supine with room air  O2 sats 90-91 while at the EOB with safety/improved safety awareness, PLB, mild SOB     Sup; To ambulate through room with no device and on room air. Mod I      Balance Sitting:     Static:  IND in chair, Remote SUP unsupported EOB,  Commode    Dynamic:  SUP w/ functional ax EOB/Commode     Standing:     Static:  Close SUP    Dynamic:  Min A w/ functional ax/mobility w/o AD     Sitting: Indep    Standing: Sup no device.      Activity Tolerance Fair     Mild-moderate SOB w/ Min Ax despite 4L O2  Tolerated ~ 15 mins of seated EOB and ~ 5-7 mins of standing ax     Fair;    Pt is very weak and will require continued movement when returning home to increase tolerance. Fair(+)   Visual/  Perceptual     Hearing: WNL   Glasses: Yes     WFL  Hearing Aids:  No                       Hand Dominance: Right    AROM Strength Additional Info:    RUE  WFL 5/5 Good ;   Good FMC/dexterity noted during ADL tasks      LUE WFL 5/5 Good ;    Good FMC/dexterity noted during ADL tasks         Sensation:  Denies numbness or tingling Kb UEs   Tone: WFL Kb UEs   Edema: None Noted Kb UEs      Comments: Upon arrival, patient was found in semi-supine. He was agreeable to participate in therapeutic ax.  No Family present during session.  Received permission from RN prior to engaging pt in OT services.       At the end of the session, patient was properly positioned in b/s chair. Call light and phone within reach, all lines and tubes intact. Oriented pt to call bell. Made all appropriate Environmental Modifications to facilitate pt's level of IND and safety. All needs met.   RN Made aware of pt's position in chair and progress.        Overall patient demonstrated improved independence and safety during completion of ADL/functional transfer/mobility tasks.      Treatment: OT treatment provided this date includes:  ·  Instruction/training on safety and adapted techniques for completion of ADLs  ·  Instruction/training on safe functional mobility/transfer

## 2021-06-17 NOTE — PROGRESS NOTES
O2 removed. Pulse ox on room air, at rest, was 93%. Patient ambulated in hallway, on room air, & pulse ox ranged from 91-93%.

## 2021-06-17 NOTE — PROGRESS NOTES
Enedina Fuller 1960    SUBJECTIVE:   Started Eliquis yesterday, continue 10 mg BID for 7 days total and then to 5 mg BID-expressed understanding  DC today, passed ambulatory O2 study, no O2 required at discharge   No acute events overnight, denies CP or swelling      OBJECTIVE  Physical Exam:  VITALS:  BP (!) 146/80   Pulse 99   Temp 98.1 °F (36.7 °C) (Oral)   Resp 20   Wt 235 lb (106.6 kg)   SpO2 92%   BMI 31.00 kg/m²   General: Alert and oriented in no acute distress  HEENT: normocephalic, PERRL, EOMI, oropharynx clear  Neck; Supple without lymphadenopathy   Respiratory: clear to auscultation, diminished bases, SOB improved, on RA. No accessory muscle use   Cardiac: regular rate and rhythm, pulses intact, no edema   Abdominal: Soft,non tender, active BS   Extremities;  No clubbing or cyanosis   Skin: NO lesions or rash   Neuro: Alert and oriented x3    DIAGNOSTIC DATA  Labs:    Lab Results   Component Value Date    WBC 16.8 (H) 06/17/2021    HGB 13.4 06/17/2021    HCT 40.3 06/17/2021    MCV 95.0 06/17/2021     (H) 06/17/2021     No results found for: CEA  Recent Labs     06/15/21  0408 06/16/21  0452 06/17/21  0457    133 132   CO2 27 27 18*   BUN 19 16 14   CREATININE 1.1 1.1 1.0     No results found for: FERRITIN  Lab Results   Component Value Date    ALT 83 (H) 06/17/2021    AST 69 (H) 06/17/2021    ALKPHOS 77 06/17/2021    BILITOT 0.5 06/17/2021     Lab Results   Component Value Date    LABALBU 1.9 (L) 06/17/2021         Current Facility-Administered Medications   Medication Dose Route Frequency Provider Last Rate Last Admin    diphenhydrAMINE (BENADRYL) tablet 25 mg  25 mg Oral Q6H PRN Monserrat Gifford MD   25 mg at 06/17/21 0114    apixaban (ELIQUIS) tablet 10 mg  10 mg Oral BID Fly Wade MD   10 mg at 06/17/21 0950    Followed by   Susan Zaragoza ON 6/24/2021] apixaban (ELIQUIS) tablet 5 mg  5 mg Oral BID Fly Wade MD        sodium polystyrene (KAYEXALATE) 15 GM/60ML with prominate pulmonary emboli bilaterally, more notably in the lower lobes. CT abdomen pelvis with a 4.2 x 3.3 cm mass medial to the spleen.    Blood cultures sent and pending-antibiotics started  UA positive for protein and trace blood     US lower extremities: DVT in left proximal and mid superficial femoral      -Eliquis started yesterday, dosing discussed with patient  -Hypercoag work up ordered for unprovoked bilateral PE              Thrombophilia panel:    -MTHFR 1298 heterozygote; normal and   MTHFR 1298 alleles present     -Factor XIII V34L heterozygote, normal and   V34L alleles present              Protein C: 80   (%)              Protein S: 143 (%)              Antithrombin III: 81 (%)              Homocysteine: 9.8 (0-15)              DRVVT: weak positive, repeat DRVVT negative               JAK2 pending    -follow up outpatient to monitor progress          -2d-Echo without RV strain   -MRI abdominal showing large splenule     Deneise Josue APRN-CNP  [place]   [phone]

## 2021-06-17 NOTE — CARE COORDINATION
Transition of Care- Patient transitioned to [address] [place] [address] 190, provided 30 day and ten dollar   co- pay cards for Eliquis, patient was given prescription assistance information and phone number yesterday. Patient is currently on room air, do not anticipating needing upon discharge, son will transport home.      Cristi TABORN, RN  [facility]   [phone]

## 2021-06-19 LAB
ANTICARDIOLIPIN IGA ANTIBODY: <10 APL (ref 0–11)
ANTICARDIOLIPIN IGG ANTIBODY: 11 GPL (ref 0–14)
BETA-2 GLYCOPROTEIN 1 IGG ANTIBODY: 0 SGU (ref 0–20)
BETA-2 GLYCOPROTEIN 1 IGM ANTIBODY: 2 SMU (ref 0–20)
CARDIOLIPIN AB IGM: <10 MPL (ref 0–12)

## 2021-06-23 LAB — JAK2 GENE MUTATION QUAL: NOT DETECTED

## 2021-08-17 ENCOUNTER — OFFICE VISIT (OUTPATIENT)
Dept: ONCOLOGY | Age: 61
End: 2021-08-17

## 2021-08-17 ENCOUNTER — HOSPITAL ENCOUNTER (OUTPATIENT)
Dept: INFUSION THERAPY | Age: 61
Discharge: HOME | End: 2021-08-17

## 2021-08-17 VITALS
RESPIRATION RATE: 18 BRPM | OXYGEN SATURATION: 97 % | SYSTOLIC BLOOD PRESSURE: 140 MMHG | BODY MASS INDEX: 31.54 KG/M2 | DIASTOLIC BLOOD PRESSURE: 95 MMHG | HEART RATE: 96 BPM | WEIGHT: 238 LBS | HEIGHT: 73 IN

## 2021-08-17 DIAGNOSIS — I26.99 ACUTE PULMONARY EMBOLISM WITHOUT ACUTE COR PULMONALE, UNSPECIFIED PULMONARY EMBOLISM TYPE (HCC): Primary | ICD-10-CM

## 2021-08-17 LAB — AT-III ACTIVITY: 99 % ACTIVITY (ref 83–121)

## 2021-08-17 PROCEDURE — 99214 OFFICE O/P EST MOD 30 MIN: CPT | Performed by: INTERNAL MEDICINE

## 2021-08-17 PROCEDURE — 99205 OFFICE O/P NEW HI 60 MIN: CPT | Performed by: INTERNAL MEDICINE

## 2021-08-17 NOTE — PROGRESS NOTES
Rachael Pina  1960 57 y.o. Referring Physician:     PCP: No primary care provider on file. Vitals:    21 0933   BP: (!) 140/95   Pulse:    Resp:    SpO2:         Wt Readings from Last 3 Encounters:   21 238 lb (108 kg)   06/15/21 235 lb (106.6 kg)   19 245 lb (111.1 kg)        Body mass index is 31.4 kg/m². Chief Complaint:   Chief Complaint   Patient presents with    New Patient         Cancer Staging  No matching staging information was found for the patient. Prior Radiation Therapy? NO    Concurrent Chemo/radiation? NO    Prior Chemotherapy? NO    Prior Hormonal Therapy? NO    Head and Neck Cancer? No, patient does NOT have HN cancer. LMP:     Age at first Menses:     :     Para:           Current Outpatient Medications:     fluticasone (FLONASE) 50 MCG/ACT nasal spray, 2 sprays by Each Nostril route daily, Disp: 1 Bottle, Rfl: 3    pantoprazole (PROTONIX) 40 MG tablet, Take 1 tablet by mouth every morning (before breakfast), Disp: 30 tablet, Rfl: 3       Past Medical History:   Diagnosis Date    Acute pulmonary embolism ([facility] [address] 06/10/2021    bilateral    Hypertension        History reviewed. No pertinent surgical history. History reviewed. No pertinent family history. Social History     Socioeconomic History    Marital status:      Spouse name: Not on file    Number of children: Not on file    Years of education: Not on file    Highest education level: Not on file   Occupational History    Not on file   Tobacco Use    Smoking status: Former Smoker    Smokeless tobacco: Never Used   Substance and Sexual Activity    Alcohol use:  Yes    Drug use: Not on file    Sexual activity: Not on file   Other Topics Concern    Not on file   Social History Narrative    Not on file     Social Determinants of Health     Financial Resource Strain:     Difficulty of Paying Living Expenses:    Food Insecurity:     Worried About Running Out of Food in the Last Year:    [address] in the Last Year:    Transportation Needs:     Lack of Transportation (Medical):  Lack of Transportation (Non-Medical):    Physical Activity:     Days of Exercise per Week:     Minutes of Exercise per Session:    Stress:     Feeling of Stress :    Social Connections:     Frequency of Communication with Friends and Family:     Frequency of Social Gatherings with Friends and Family:     Attends Hoahaoism Services:     Active Member of Clubs or Organizations:     Attends Club or Organization Meetings:     Marital Status:    Intimate Partner Violence:     Fear of Current or Ex-Partner:     Emotionally Abused:     Physically Abused:     Sexually Abused:            Occupation:    Retired:  YES: Patient is retired from [address] [place] [address]. REVIEW OF SYSTEMS: <<For Level 5, 10 or more systems>>     Pacemaker/Defibulator/ICD:  No    Mediport: No           FALLS RISK SCREENING ASSESSMENT    Instructions:  Assess the patient and Kiana the appropriate indicators that are present for fall risk identification. Total the numbers circled and assign a fall risk score from Table 2.  Reassess patient at a minimum every 12 weeks or with status change. Assessment   Date  8/17/2021     1. Mental Ability: confusion/cognitively impaired No - 0       2. Elimination Issues: incontinence, frequency No - 0       3. Ambulatory: use of assistive devices (walker, cane, off-loading devices), attached to equipment (IV pole, oxygen) No - 0     4. Sensory Limitations: dizziness, vertigo, impaired vision No - 0       5. Age Less than 65 years - 0       6. Medication: diuretics, strong analgesics, hypnotics, sedatives, antihypertensive agents   No - 0   7. Falls:  recent history of falls within the last 3 months (not to include slipping or tripping)   No - 0   TOTAL 0      If score of 4 or greater was education given?  No       TABLE 2   Risk Score Risk Level Plan of Care   0-3 Little or  No Risk 1. Provide assistance as indicated for ambulation activities  2. Reorient confused/cognitively impaired patient  3. Call-light/bell within patient's reach  4. Chair/bed in low position, stretcher/bed with siderails up except when performing patient care activities  5. Educate patient/family/caregiver on falls prevention  6.  Reassess in 12 weeks or with any noted change in patient condition which places them at a risk for a fall   4-6 Moderate Risk 1. Provide assistance as indicated for ambulation activities  2. Reorient confused/cognitively impaired patient  3. Call-light/bell within patient's reach  4. Chair/bed in low position, stretcher/bed with siderails up except when performing patient care activities  5. Educate patient/family/caregiver on falls prevention  6. Falls risk precaution (Yellow sticker Level II) placed on patient chart   7 or   Higher High Risk 1. Place patient in easily observable treatment room  2. Patient attended at all times by family member or staff  3. Provide assistance as indicated for ambulation activities  4. Reorient confused/cognitively impaired patient  5. Call-light/bell within patient's reach  6. Chair/bed in low position, stretcher/bed with siderails up except when performing patient care activities  7. Educate patient/family/caregiver on falls prevention  8. Falls risk precaution (Yellow sticker Level III) placed on patient chart           MALNUTRITION RISK SCREENING ASSESSMENT    Instructions:  Assess the patient and enter the appropriate indicators that are present for nutrition risk identification. Total the numbers entered and assign a risk score. Follow the appropriate action for total score listed below. Assessment   Date  8/17/2021     1. Have you lost weight without trying? 1- Yes, 0.5 kg to 5 kg     2. Have you been eating poorly because of a decreased appetite? 0- No   3.  Do you have a diagnosis of head and neck cancer?       0- No                                                                                    TOTAL 1          Score of 0-1: No action  Score 2 or greater:  · For Non-Diabetic Patient: Recommend adding Ensure Enlive 2 x daily and provide patient with Ensure wellness bag with coupons  · For Diabetic Patient: Recommend adding Glucerna Shake 2 x daily and provide patient with Glucerna Wellness bag with coupons  · Route to the dietitian via Tracy Taylor RN

## 2021-08-17 NOTE — PROGRESS NOTES
[address] MED ONCOLOGY  [facility] [facility] [address]  Dept: [phone]  Attending consult Note      Reason for Visit:   DVT and B/l PE. Referring Physician:  Dr. Sotero Glover    PCP:  No primary care provider on file. History of Present Illness:  Mr. Mariya Ojeda, 57year old male,  PMH of HTN, diverticulitis and former smoker (1ppd x10 years) presented as new patient to follow upon his b/l PE, that was initially presented to ER 06/10/2021 for SOB with right sided thoracic pain. He was started on heparin, transitioned to Eliquis one day before discharge, then continued 10 mg BID for 7 days total and then to 5 mg BID. Pt was discharged on 6/17/2021. With regards to the b/l PE, likely 2/2 to Lt LE DVT, likely unprovoked, pt denied immobility, such as long distant car drive, taking flight, etc  CTA chest with prominate pulmonary emboli bilaterally, more notably in the lower lobes. US lower extremities: DVT in left proximal and mid superficial femoral   -Hypercoag work up ordered for unprovoked bilateral PE              Thrombophilia panel:                          -MTHFR 1298 heterozygote; normal and                   MTHFR 1298 alleles present                           -Factor XIII V34L heterozygote, normal and              V34L alleles present              Protein C: 80   (%)              Protein S: 143 (%)              Antithrombin III: 81 (%), slightly depressed, maybe due to acute VTE episode              Homocysteine: 9.8 (0-15)              DRVVT: weak positive, but repeat DRVVT negative               JAK2 -ve   -2d-Echo without RV strain   -MRI abdominal showing large splenule     Today, 8/17/2021, He is feeling well, but he reported that he stopped taking eliquis 2-3 wks ago, for which he does not recall the exact date. He lost followup info with PCP after discharge, and his eliquis run out.  He felt better after the course of eliquis and he was not bothered by out of eliquis. He reports some SOB, particularly after 1 flight of stair climbing, but definitely better than on admission, he endorsed that SOB is not particularly getting better for the last few weeks, but not getting worse neither. He denies any CP, palpitation, fevers, chills, LE swellin, pain or discomfort. Labs reviewed with pt during this OV, and all questions answered. We will restart his eliquis immediately, with loading dose. Review of Systems;  CONSTITUTIONAL: No fever, chills. Good appetite and energy level. ENMT: Eyes: No diplopia; Nose: No epistaxis. Mouth: No sore throat. RESPIRATORY: No hemoptysis, cough. CARDIOVASCULAR: No chest pain, palpitations. GASTROINTESTINAL: No nausea/vomiting, abdominal pain, diarrhea/constipation. GENITOURINARY: No dysuria, urinary frequency, hematuria. NEURO: No syncope, presyncope, headache. Remainder:  ROS NEGATIVE    Past Medical History:      Diagnosis Date    Acute pulmonary embolism ([facility] [address] 06/10/2021    bilateral    Hypertension      Patient Active Problem List   Diagnosis    Acute pulmonary embolism without acute cor pulmonale ([facility] [address]        Past Surgical History:  History reviewed. No pertinent surgical history. Family History:  Family History   Problem Relation Age of Onset    Cancer Mother         lung    Heart Disease Father     No Known Problems Sister     No Known Problems Brother     No Known Problems Sister        Medications:  Reviewed and reconciled.     Social History:  Social History     Socioeconomic History    Marital status:      Spouse name: Not on file    Number of children: Not on file    Years of education: Not on file    Highest education level: Not on file   Occupational History    Not on file   Tobacco Use    Smoking status: Former Smoker     Packs/day: 1.00     Years: 10.00     Pack years: 10.00     Types: Cigarettes     Quit date:      Years since quittin.6    Smokeless tobacco: Never Used   Substance and Sexual Activity    Alcohol use: Yes     Alcohol/week: 2.0 standard drinks     Types: 2 Cans of beer per week     Comment: 2 times per week    Drug use: Not on file    Sexual activity: Yes     Partners: Female   Other Topics Concern    Not on file   Social History Narrative    Not on file     Social Determinants of Health     Financial Resource Strain:     Difficulty of Paying Living Expenses:    Food Insecurity:     Worried About Running Out of Food in the Last Year:     [address] in the Last Year:    Transportation Needs:     Lack of Transportation (Medical):  Lack of Transportation (Non-Medical):    Physical Activity:     Days of Exercise per Week:     Minutes of Exercise per Session:    Stress:     Feeling of Stress :    Social Connections:     Frequency of Communication with Friends and Family:     Frequency of Social Gatherings with Friends and Family:     Attends Mandaen Services:     Active Member of Clubs or Organizations:     Attends Club or Organization Meetings:     Marital Status:    Intimate Partner Violence:     Fear of Current or Ex-Partner:     Emotionally Abused:     Physically Abused:     Sexually Abused: Allergies:  No Known Allergies    Physical Exam:  BP (!) 140/95   Pulse 96   Resp 18   Ht 6' 1\" (1.854 m)   Wt 238 lb (108 kg)   SpO2 97%   BMI 31.40 kg/m²   GENERAL: Alert, oriented x 3, not in acute distress. HEENT: PERRLA; EOMI. Oropharynx clear. NECK: Supple. No palpable cervical or supraclavicular lymphadenopathy. LUNGS: Good air entry bilaterally. No wheezing, crackles or rhonchi. CARDIOVASCULAR: Regular rate. No murmurs, rubs or gallops. ABDOMEN: Soft. Non-tender, non-distended. Positive bowel sounds. EXTREMITIES: Without clubbing, cyanosis, or edema, no LE swelling or tenderness   NEUROLOGIC: No focal deficits. ECOG PS 0    No results found.      Impression/Plan:    Mr. Juancho Jones, 57year old male, PMH of HTN, diverticulitis and former smoker (1ppd x10 years) presented as new patient to follow upon his b/l PE, that was initially presented to ER 06/10/2021 for SOB with right sided thoracic pain. He was properly treatment as inpatient, transitioned to Eliquis one day before discharge, then continued 10 mg BID for 7 days total and then to 5 mg BID. Pt was discharged on 6/17/2021. With regards to the b/l PE, likely 2/2 to Lt LE DVT, likely unprovoked, pt denied immobility, such as long distant car drive, taking flight, etc  CTA chest with prominate pulmonary emboli bilaterally, more notably in the lower lobes. US lower extremities: DVT in left proximal and mid superficial femoral   -Hypercoag work up ordered for unprovoked bilateral PE              Thrombophilia panel:                          -MTHFR 1298 heterozygote; normal and                   MTHFR 1298 alleles present                           -Factor XIII V34L heterozygote, normal and              V34L alleles present              Protein C: 80   (%)              Protein S: 143 (%)              Antithrombin III: 81 (%), slightly depressed, maybe due to acute VTE episode              Homocysteine: 9.8 (0-15)              DRVVT: weak positive, but repeat DRVVT negative               JAK2 -ve   -2d-Echo without RV strain   -MRI abdominal showing large splenule     Today, 8/17/2021, He is feeling well, but he reported that he stopped taking eliquis 2-3 wks ago, for which he does not recall the exact date. He lost followup info with PCP after discharge, and his eliquis run out. He felt better after the course of eliquis and he was not bothered by out of eliquis. He reports some SOB, particularly after 1 flight of stair climbing, but definitely better than on admission, he endorsed that SOB is not particularly getting better for the last few weeks, but not getting worse neither.  He denies any CP, palpitation, fevers, chills, LE swellin, pain or discomfort. Patient with recent DVT and b/l PE, the event was likely unprovoked, no RV strain, discussed with the patient the risk of VTE recurrence, recommended anticoagulation for at least a year, will reevaluate at the 1 year papi. The patient had unfortunate stop taking the Eliquis, discussed with importance of anticoagulation, he is to start the Eliquis immediately, 10 mg p.o. twice daily for 7 days followed by 5 mg p.o. twice daily, prescription was sent to her pharmacy. Testing for inherited thrombophilia was unremarkable except for slightly decreased Antithrombin III, which could be seen in the setting of acute thrombosis, will repeat today. Thank you for allowing us to participate in the care of Mr. Rodríguez [facility] in 3 months.     Sapna Dominique MD   HEMATOLOGY/MEDICAL ONCOLOGY  Internal Medicine, PGY-1    Diana Ahumada MD   HEMATOLOGY/MEDICAL [address] [facility],  [address]  [address] [facility] MED ONCOLOGY  [facility] [address]  [facility] [place] [address]  Dept: [phone]

## 2025-03-06 ENCOUNTER — HOSPITAL ENCOUNTER (INPATIENT)
Age: 65
LOS: 3 days | Discharge: HOME | DRG: 176 | End: 2025-03-09
Attending: EMERGENCY MEDICINE | Admitting: STUDENT IN AN ORGANIZED HEALTH CARE EDUCATION/TRAINING PROGRAM
Payer: MEDICARE

## 2025-03-06 ENCOUNTER — APPOINTMENT (OUTPATIENT)
Dept: GENERAL RADIOLOGY | Age: 65
DRG: 176 | End: 2025-03-06
Payer: MEDICARE

## 2025-03-06 ENCOUNTER — APPOINTMENT (OUTPATIENT)
Dept: ULTRASOUND IMAGING | Age: 65
DRG: 176 | End: 2025-03-06
Payer: MEDICARE

## 2025-03-06 ENCOUNTER — APPOINTMENT (OUTPATIENT)
Dept: CT IMAGING | Age: 65
DRG: 176 | End: 2025-03-06
Payer: MEDICARE

## 2025-03-06 DIAGNOSIS — I26.99 PULMONARY EMBOLISM ON RIGHT (HCC): Primary | ICD-10-CM

## 2025-03-06 PROBLEM — I10 PRIMARY HYPERTENSION: Status: ACTIVE | Noted: 2025-03-06

## 2025-03-06 LAB
ANION GAP SERPL CALCULATED.3IONS-SCNC: 12 MMOL/L (ref 7–16)
BASOPHILS # BLD: 0.05 K/UL (ref 0–0.2)
BASOPHILS NFR BLD: 0 % (ref 0–2)
BNP SERPL-MCNC: 165 PG/ML (ref 0–125)
BUN SERPL-MCNC: 12 MG/DL (ref 6–23)
CALCIUM SERPL-MCNC: 9.4 MG/DL (ref 8.6–10.2)
CHLORIDE SERPL-SCNC: 101 MMOL/L (ref 98–107)
CO2 SERPL-SCNC: 21 MMOL/L (ref 22–29)
CREAT SERPL-MCNC: 1.2 MG/DL (ref 0.7–1.2)
ECHO BSA: 2.42 M2
EKG ATRIAL RATE: 106 BPM
EKG P AXIS: 32 DEGREES
EKG P-R INTERVAL: 162 MS
EKG Q-T INTERVAL: 334 MS
EKG QRS DURATION: 96 MS
EKG QTC CALCULATION (BAZETT): 443 MS
EKG R AXIS: -26 DEGREES
EKG T AXIS: 38 DEGREES
EKG VENTRICULAR RATE: 106 BPM
EOSINOPHIL # BLD: 0.3 K/UL (ref 0.05–0.5)
EOSINOPHILS RELATIVE PERCENT: 2 % (ref 0–6)
ERYTHROCYTE [DISTWIDTH] IN BLOOD BY AUTOMATED COUNT: 11.9 % (ref 11.5–15)
GFR, ESTIMATED: 65 ML/MIN/1.73M2
GLUCOSE SERPL-MCNC: 141 MG/DL (ref 74–99)
HCT VFR BLD AUTO: 49.4 % (ref 37–54)
HGB BLD-MCNC: 15.9 G/DL (ref 12.5–16.5)
IMM GRANULOCYTES # BLD AUTO: 0.09 K/UL (ref 0–0.58)
IMM GRANULOCYTES NFR BLD: 1 % (ref 0–5)
LYMPHOCYTES NFR BLD: 1.31 K/UL (ref 1.5–4)
LYMPHOCYTES RELATIVE PERCENT: 9 % (ref 20–42)
MCH RBC QN AUTO: 30.8 PG (ref 26–35)
MCHC RBC AUTO-ENTMCNC: 32.2 G/DL (ref 32–34.5)
MCV RBC AUTO: 95.7 FL (ref 80–99.9)
MONOCYTES NFR BLD: 1.17 K/UL (ref 0.1–0.95)
MONOCYTES NFR BLD: 8 % (ref 2–12)
NEUTROPHILS NFR BLD: 81 % (ref 43–80)
NEUTS SEG NFR BLD: 12.05 K/UL (ref 1.8–7.3)
PLATELET # BLD AUTO: 263 K/UL (ref 130–450)
PMV BLD AUTO: 9.2 FL (ref 7–12)
POTASSIUM SERPL-SCNC: 4 MMOL/L (ref 3.5–5)
RBC # BLD AUTO: 5.16 M/UL (ref 3.8–5.8)
SODIUM SERPL-SCNC: 134 MMOL/L (ref 132–146)
TROPONIN I SERPL HS-MCNC: 7 NG/L (ref 0–11)
WBC OTHER # BLD: 15 K/UL (ref 4.5–11.5)

## 2025-03-06 PROCEDURE — 71275 CT ANGIOGRAPHY CHEST: CPT

## 2025-03-06 PROCEDURE — 93005 ELECTROCARDIOGRAM TRACING: CPT | Performed by: NURSE PRACTITIONER

## 2025-03-06 PROCEDURE — 2500000003 HC RX 250 WO HCPCS: Performed by: STUDENT IN AN ORGANIZED HEALTH CARE EDUCATION/TRAINING PROGRAM

## 2025-03-06 PROCEDURE — 83880 ASSAY OF NATRIURETIC PEPTIDE: CPT

## 2025-03-06 PROCEDURE — 1200000000 HC SEMI PRIVATE

## 2025-03-06 PROCEDURE — 80048 BASIC METABOLIC PNL TOTAL CA: CPT

## 2025-03-06 PROCEDURE — 96372 THER/PROPH/DIAG INJ SC/IM: CPT

## 2025-03-06 PROCEDURE — 93971 EXTREMITY STUDY: CPT

## 2025-03-06 PROCEDURE — 99285 EMERGENCY DEPT VISIT HI MDM: CPT

## 2025-03-06 PROCEDURE — 84484 ASSAY OF TROPONIN QUANT: CPT

## 2025-03-06 PROCEDURE — 71046 X-RAY EXAM CHEST 2 VIEWS: CPT

## 2025-03-06 PROCEDURE — 6360000004 HC RX CONTRAST MEDICATION: Performed by: RADIOLOGY

## 2025-03-06 PROCEDURE — 6360000002 HC RX W HCPCS: Performed by: STUDENT IN AN ORGANIZED HEALTH CARE EDUCATION/TRAINING PROGRAM

## 2025-03-06 PROCEDURE — 6360000002 HC RX W HCPCS: Performed by: EMERGENCY MEDICINE

## 2025-03-06 PROCEDURE — 93010 ELECTROCARDIOGRAM REPORT: CPT | Performed by: INTERNAL MEDICINE

## 2025-03-06 PROCEDURE — 99222 1ST HOSP IP/OBS MODERATE 55: CPT | Performed by: STUDENT IN AN ORGANIZED HEALTH CARE EDUCATION/TRAINING PROGRAM

## 2025-03-06 PROCEDURE — 85025 COMPLETE CBC W/AUTO DIFF WBC: CPT

## 2025-03-06 RX ORDER — SODIUM CHLORIDE 9 MG/ML
INJECTION, SOLUTION INTRAVENOUS PRN
Status: DISCONTINUED | OUTPATIENT
Start: 2025-03-06 | End: 2025-03-09 | Stop reason: HOSPADM

## 2025-03-06 RX ORDER — ONDANSETRON 4 MG/1
4 TABLET, ORALLY DISINTEGRATING ORAL EVERY 8 HOURS PRN
Status: DISCONTINUED | OUTPATIENT
Start: 2025-03-06 | End: 2025-03-09 | Stop reason: HOSPADM

## 2025-03-06 RX ORDER — ONDANSETRON 2 MG/ML
4 INJECTION INTRAMUSCULAR; INTRAVENOUS EVERY 6 HOURS PRN
Status: DISCONTINUED | OUTPATIENT
Start: 2025-03-06 | End: 2025-03-09 | Stop reason: HOSPADM

## 2025-03-06 RX ORDER — POLYETHYLENE GLYCOL 3350 17 G/17G
17 POWDER, FOR SOLUTION ORAL DAILY PRN
Status: DISCONTINUED | OUTPATIENT
Start: 2025-03-06 | End: 2025-03-09 | Stop reason: HOSPADM

## 2025-03-06 RX ORDER — POTASSIUM CHLORIDE 7.45 MG/ML
10 INJECTION INTRAVENOUS PRN
Status: DISCONTINUED | OUTPATIENT
Start: 2025-03-06 | End: 2025-03-09 | Stop reason: HOSPADM

## 2025-03-06 RX ORDER — ACETAMINOPHEN 325 MG/1
650 TABLET ORAL EVERY 6 HOURS PRN
Status: DISCONTINUED | OUTPATIENT
Start: 2025-03-06 | End: 2025-03-09 | Stop reason: HOSPADM

## 2025-03-06 RX ORDER — FLUTICASONE PROPIONATE 50 MCG
2 SPRAY, SUSPENSION (ML) NASAL DAILY PRN
Status: DISCONTINUED | OUTPATIENT
Start: 2025-03-06 | End: 2025-03-09 | Stop reason: HOSPADM

## 2025-03-06 RX ORDER — SODIUM CHLORIDE 0.9 % (FLUSH) 0.9 %
5-40 SYRINGE (ML) INJECTION PRN
Status: DISCONTINUED | OUTPATIENT
Start: 2025-03-06 | End: 2025-03-09 | Stop reason: HOSPADM

## 2025-03-06 RX ORDER — AMLODIPINE BESYLATE 5 MG/1
5 TABLET ORAL DAILY
Status: DISCONTINUED | OUTPATIENT
Start: 2025-03-06 | End: 2025-03-09

## 2025-03-06 RX ORDER — KETOROLAC TROMETHAMINE 15 MG/ML
15 INJECTION, SOLUTION INTRAMUSCULAR; INTRAVENOUS EVERY 6 HOURS PRN
Status: DISCONTINUED | OUTPATIENT
Start: 2025-03-06 | End: 2025-03-09 | Stop reason: HOSPADM

## 2025-03-06 RX ORDER — HYDRALAZINE HYDROCHLORIDE 20 MG/ML
10 INJECTION INTRAMUSCULAR; INTRAVENOUS EVERY 4 HOURS PRN
Status: DISCONTINUED | OUTPATIENT
Start: 2025-03-06 | End: 2025-03-09 | Stop reason: HOSPADM

## 2025-03-06 RX ORDER — IOPAMIDOL 755 MG/ML
75 INJECTION, SOLUTION INTRAVASCULAR
Status: COMPLETED | OUTPATIENT
Start: 2025-03-06 | End: 2025-03-06

## 2025-03-06 RX ORDER — ENOXAPARIN SODIUM 150 MG/ML
1 INJECTION SUBCUTANEOUS ONCE
Status: COMPLETED | OUTPATIENT
Start: 2025-03-06 | End: 2025-03-06

## 2025-03-06 RX ORDER — ACETAMINOPHEN 650 MG/1
650 SUPPOSITORY RECTAL EVERY 6 HOURS PRN
Status: DISCONTINUED | OUTPATIENT
Start: 2025-03-06 | End: 2025-03-09 | Stop reason: HOSPADM

## 2025-03-06 RX ORDER — SODIUM CHLORIDE 0.9 % (FLUSH) 0.9 %
5-40 SYRINGE (ML) INJECTION EVERY 12 HOURS SCHEDULED
Status: DISCONTINUED | OUTPATIENT
Start: 2025-03-06 | End: 2025-03-09 | Stop reason: HOSPADM

## 2025-03-06 RX ORDER — MAGNESIUM SULFATE IN WATER 40 MG/ML
2000 INJECTION, SOLUTION INTRAVENOUS PRN
Status: DISCONTINUED | OUTPATIENT
Start: 2025-03-06 | End: 2025-03-09 | Stop reason: HOSPADM

## 2025-03-06 RX ORDER — ENOXAPARIN SODIUM 150 MG/ML
1 INJECTION SUBCUTANEOUS 2 TIMES DAILY
Status: DISCONTINUED | OUTPATIENT
Start: 2025-03-07 | End: 2025-03-08

## 2025-03-06 RX ORDER — POTASSIUM CHLORIDE 1500 MG/1
40 TABLET, EXTENDED RELEASE ORAL PRN
Status: DISCONTINUED | OUTPATIENT
Start: 2025-03-06 | End: 2025-03-09 | Stop reason: HOSPADM

## 2025-03-06 RX ADMIN — SODIUM CHLORIDE, PRESERVATIVE FREE 10 ML: 5 INJECTION INTRAVENOUS at 20:57

## 2025-03-06 RX ADMIN — IOPAMIDOL 75 ML: 755 INJECTION, SOLUTION INTRAVENOUS at 14:58

## 2025-03-06 RX ADMIN — ENOXAPARIN SODIUM 120 MG: 150 INJECTION SUBCUTANEOUS at 16:11

## 2025-03-06 RX ADMIN — KETOROLAC TROMETHAMINE 15 MG: 15 INJECTION, SOLUTION INTRAMUSCULAR; INTRAVENOUS at 20:59

## 2025-03-06 ASSESSMENT — PAIN - FUNCTIONAL ASSESSMENT: PAIN_FUNCTIONAL_ASSESSMENT: ACTIVITIES ARE NOT PREVENTED

## 2025-03-06 ASSESSMENT — PAIN DESCRIPTION - DESCRIPTORS: DESCRIPTORS: ACHING

## 2025-03-06 ASSESSMENT — PAIN SCALES - GENERAL
PAINLEVEL_OUTOF10: 0
PAINLEVEL_OUTOF10: 5

## 2025-03-06 ASSESSMENT — PAIN DESCRIPTION - LOCATION: LOCATION: CHEST

## 2025-03-06 NOTE — ED NOTES
Name: Brian S Koeppen  : 1960  MRN: [id number]    Date: 3/6/2025    Benefits of immediately proceeding with Radiology exam outweigh the risks and therefore the following is being waived:      [] Pregnancy test    [] Protocol for Iodine allergy    [] MRI questionnaire    [x] BUN/Creatinine        DO Noe Manuel Charles, DO  25 [id number]

## 2025-03-06 NOTE — ED PROVIDER NOTES
65-year-old male no previous history of pulmonary embolism who states he has been off Eliquis for several years despite no physician telling him to be off Eliquis presents to the emergency department with right rib pain which she states is similar to the previous time he had a blood clot.  Patient reports some mild shortness of breath and nursing staff reporting mild tachycardia here in the emergency department.  He states no nausea vomiting diarrhea abdominal pain he does report some mild left sided leg pain but no other concerns or complaints at this time.  He states no other symptoms    The history is provided by the patient.   Chest Pain  Pain location:  R chest  Pain quality: aching    Pain radiates to:  Does not radiate  Pain severity:  Mild  Onset quality:  Gradual  Duration:  3 days  Timing:  Intermittent  Progression:  Waxing and waning  Chronicity:  New  Relieved by:  Nothing  Worsened by:  Nothing  Ineffective treatments:  None tried  Associated symptoms: shortness of breath    Associated symptoms: no abdominal pain, no back pain, no cough, no fever, no headache, no nausea, no vomiting and no weakness    Risk factors: prior DVT/PE         Review of Systems   Constitutional:  Negative for chills and fever.   HENT:  Negative for ear pain, sinus pressure and sore throat.    Eyes:  Negative for pain, discharge and redness.   Respiratory:  Positive for shortness of breath. Negative for cough and wheezing.    Cardiovascular:  Positive for chest pain.   Gastrointestinal:  Negative for abdominal pain, diarrhea, nausea and vomiting.   Genitourinary:  Negative for dysuria and frequency.   Musculoskeletal:  Negative for arthralgias and back pain.   Skin:  Negative for rash and wound.   Neurological:  Negative for weakness and headaches.   Hematological:  Negative for adenopathy.   All other systems reviewed and are negative.       Physical Exam  Constitutional:       Appearance: Normal appearance.   HENT:

## 2025-03-06 NOTE — ED NOTES
Department of Emergency Medicine  FIRST PROVIDER TRIAGE NOTE             Independent MLP           3/6/25  2:10 PM EST    Date of Encounter: 3/6/25   MRN: [id number]      HPI: Brian S Koeppen is a 65 y.o. male who presents to the ED for Rib Pain (injury) (States same pain as the last time he has PE   on eliquis but has not been taking them )       ROS: Negative for cough or vomiting.    PE: Gen Appearance/Constitutional: alert  HEENT: NC/NT. PERRLA,  Airway patent.    Provider-Patient relationship only established for Provider In Triage (PIT)  Full assessment, HPI and examination not performed, therefore, it is not yet possible to state whether or not an emergency medical condition exists   Focused assessment only during triage. This is not a comprehensive evaluation due to no physical ER space, staff shortage and high numbers of boarding patients in the ER.       Initial Plan of Care: All treatment areas with department are currently occupied. Plan to order/Initiate the following while awaiting opening in ED.  Initiate Treatment-Testing, Proceed toTreatment Area When Bed Available for ED Attending/MLP to Continue Care    Electronically signed by LISETTE Marinelli CNP   DD: 3/6/25      oRbert Witt APRN - CNP  03/06/25 7490

## 2025-03-06 NOTE — H&P
sclera. External eyelids without pus/discharge. No lesions on external ears and nose anteriorly.   Neck: Trachea midline.   Cardiovascular: Tachy, no rubs, S4 gallop noted, 1+ pitting LE edema.    Respiratory: Normal effort, CTAB, no wheezes/rhonchi/rales. Diminished breath sounds at the bases.   Abdomen: Soft, ND/NT, bowel sounds present.   Genitourinary: Deferred  Rectal: Deferred  Neurological: Grossly nonfocal.   Psychiatry: Appropriate mood and affect.   Skin: Warm and non-taut on palpation. No ulcers noted on visible skin.       LABS:  Recent Labs     03/06/25  1434      K 4.0      CO2 21*   BUN 12   CREATININE 1.2   GLUCOSE 141*   CALCIUM 9.4       Recent Labs     03/06/25  1434   WBC 15.0*   RBC 5.16   HGB 15.9   HCT 49.4   MCV 95.7   MCH 30.8   MCHC 32.2   RDW 11.9      MPV 9.2       No results for input(s): \"POCGLU\" in the last 72 hours.    CBC with Differential:    Lab Results   Component Value Date/Time    WBC 15.0 03/06/2025 02:34 PM    RBC 5.16 03/06/2025 02:34 PM    HGB 15.9 03/06/2025 02:34 PM    HCT 49.4 03/06/2025 02:34 PM     03/06/2025 02:34 PM    MCV 95.7 03/06/2025 02:34 PM    MCH 30.8 03/06/2025 02:34 PM    MCHC 32.2 03/06/2025 02:34 PM    RDW 11.9 03/06/2025 02:34 PM    METASPCT 2.6 06/17/2021 08:15 AM    LYMPHOPCT 9 03/06/2025 02:34 PM    MONOPCT 8 03/06/2025 02:34 PM    MYELOPCT 1.7 06/17/2021 08:15 AM    EOSPCT 2 03/06/2025 02:34 PM    BASOPCT 0 03/06/2025 02:34 PM    MONOSABS 1.17 03/06/2025 02:34 PM    LYMPHSABS 1.31 03/06/2025 02:34 PM    EOSABS 0.30 03/06/2025 02:34 PM    BASOSABS 0.05 03/06/2025 02:34 PM     BMP:    Lab Results   Component Value Date/Time     03/06/2025 02:34 PM    K 4.0 03/06/2025 02:34 PM    K 4.5 06/10/2021 11:11 AM     03/06/2025 02:34 PM    CO2 21 03/06/2025 02:34 PM    BUN 12 03/06/2025 02:34 PM    CREATININE 1.2 03/06/2025 02:34 PM    CALCIUM 9.4 03/06/2025 02:34 PM    GFRAA >60 06/17/2021 04:57 AM    LABGLOM 65 03/06/2025

## 2025-03-07 ENCOUNTER — APPOINTMENT (OUTPATIENT)
Age: 65
DRG: 176 | End: 2025-03-07
Attending: STUDENT IN AN ORGANIZED HEALTH CARE EDUCATION/TRAINING PROGRAM
Payer: MEDICARE

## 2025-03-07 LAB
ANION GAP SERPL CALCULATED.3IONS-SCNC: 13 MMOL/L (ref 7–16)
BASOPHILS # BLD: 0.06 K/UL (ref 0–0.2)
BASOPHILS NFR BLD: 1 % (ref 0–2)
BUN SERPL-MCNC: 17 MG/DL (ref 6–23)
CALCIUM SERPL-MCNC: 9.5 MG/DL (ref 8.6–10.2)
CHLORIDE SERPL-SCNC: 102 MMOL/L (ref 98–107)
CO2 SERPL-SCNC: 23 MMOL/L (ref 22–29)
CREAT SERPL-MCNC: 1.5 MG/DL (ref 0.7–1.2)
EOSINOPHIL # BLD: 0.59 K/UL (ref 0.05–0.5)
EOSINOPHILS RELATIVE PERCENT: 5 % (ref 0–6)
ERYTHROCYTE [DISTWIDTH] IN BLOOD BY AUTOMATED COUNT: 12 % (ref 11.5–15)
GFR, ESTIMATED: 52 ML/MIN/1.73M2
GLUCOSE SERPL-MCNC: 102 MG/DL (ref 74–99)
HCT VFR BLD AUTO: 47.9 % (ref 37–54)
HGB BLD-MCNC: 15.2 G/DL (ref 12.5–16.5)
IMM GRANULOCYTES # BLD AUTO: 0.09 K/UL (ref 0–0.58)
IMM GRANULOCYTES NFR BLD: 1 % (ref 0–5)
LYMPHOCYTES NFR BLD: 1.84 K/UL (ref 1.5–4)
LYMPHOCYTES RELATIVE PERCENT: 15 % (ref 20–42)
MCH RBC QN AUTO: 31.1 PG (ref 26–35)
MCHC RBC AUTO-ENTMCNC: 31.7 G/DL (ref 32–34.5)
MCV RBC AUTO: 98 FL (ref 80–99.9)
MONOCYTES NFR BLD: 1.19 K/UL (ref 0.1–0.95)
MONOCYTES NFR BLD: 9 % (ref 2–12)
NEUTROPHILS NFR BLD: 70 % (ref 43–80)
NEUTS SEG NFR BLD: 8.95 K/UL (ref 1.8–7.3)
PLATELET # BLD AUTO: 270 K/UL (ref 130–450)
PMV BLD AUTO: 9.5 FL (ref 7–12)
POTASSIUM SERPL-SCNC: 4.8 MMOL/L (ref 3.5–5)
RBC # BLD AUTO: 4.89 M/UL (ref 3.8–5.8)
SODIUM SERPL-SCNC: 138 MMOL/L (ref 132–146)
WBC OTHER # BLD: 12.7 K/UL (ref 4.5–11.5)

## 2025-03-07 PROCEDURE — 1200000000 HC SEMI PRIVATE

## 2025-03-07 PROCEDURE — 36415 COLL VENOUS BLD VENIPUNCTURE: CPT

## 2025-03-07 PROCEDURE — 6370000000 HC RX 637 (ALT 250 FOR IP): Performed by: STUDENT IN AN ORGANIZED HEALTH CARE EDUCATION/TRAINING PROGRAM

## 2025-03-07 PROCEDURE — 85025 COMPLETE CBC W/AUTO DIFF WBC: CPT

## 2025-03-07 PROCEDURE — 80048 BASIC METABOLIC PNL TOTAL CA: CPT

## 2025-03-07 PROCEDURE — 99232 SBSQ HOSP IP/OBS MODERATE 35: CPT | Performed by: STUDENT IN AN ORGANIZED HEALTH CARE EDUCATION/TRAINING PROGRAM

## 2025-03-07 PROCEDURE — 93306 TTE W/DOPPLER COMPLETE: CPT

## 2025-03-07 PROCEDURE — 6360000002 HC RX W HCPCS: Performed by: STUDENT IN AN ORGANIZED HEALTH CARE EDUCATION/TRAINING PROGRAM

## 2025-03-07 PROCEDURE — 2500000003 HC RX 250 WO HCPCS: Performed by: STUDENT IN AN ORGANIZED HEALTH CARE EDUCATION/TRAINING PROGRAM

## 2025-03-07 RX ADMIN — KETOROLAC TROMETHAMINE 15 MG: 15 INJECTION, SOLUTION INTRAMUSCULAR; INTRAVENOUS at 19:04

## 2025-03-07 RX ADMIN — SODIUM CHLORIDE, PRESERVATIVE FREE 5 ML: 5 INJECTION INTRAVENOUS at 22:22

## 2025-03-07 RX ADMIN — ENOXAPARIN SODIUM 120 MG: 150 INJECTION SUBCUTANEOUS at 09:15

## 2025-03-07 RX ADMIN — SODIUM CHLORIDE, PRESERVATIVE FREE 10 ML: 5 INJECTION INTRAVENOUS at 09:16

## 2025-03-07 RX ADMIN — ENOXAPARIN SODIUM 120 MG: 150 INJECTION SUBCUTANEOUS at 22:12

## 2025-03-07 RX ADMIN — AMLODIPINE BESYLATE 5 MG: 5 TABLET ORAL at 09:15

## 2025-03-07 ASSESSMENT — PAIN SCALES - GENERAL: PAINLEVEL_OUTOF10: 5

## 2025-03-07 NOTE — ACP (ADVANCE CARE PLANNING)
Advance Care Planning   Healthcare Decision Maker:    Primary Decision Maker: Koeppen,Bryan R - [facility] - [phone]    Click here to complete Healthcare Decision Makers including selection of the Healthcare Decision Maker Relationship (ie \"Primary\").

## 2025-03-07 NOTE — CARE COORDINATION
Transition of Care-met with patient introduced myself and CM role in care coordination. patient and son reside in a tow story home, a few steps to enter residence. Bedroom and main bath are on second level, half bath on first level. Prior to admission patient was independent, no history of HHC, SNF or DME  Patient DOES NOT have a PCP-call to patient access to establish-preferred pharmacy is [facility].Discharge plan is home- Son to transport.        Claudia TABORN, RN  [facility]

## 2025-03-07 NOTE — PLAN OF CARE
Problem: ABCDS Injury Assessment  Goal: Absence of physical injury  3/7/2025 1144 by Sabrina Walker RN  Outcome: Progressing  3/7/2025 0135 by Marjan Castellanos RN  Outcome: Progressing     Problem: Discharge Planning  Goal: Discharge to home or other facility with appropriate resources  3/7/2025 1144 by Sabrina Walker RN  Outcome: Progressing  3/7/2025 0135 by Marjan Castellanos RN  Outcome: Progressing

## 2025-03-07 NOTE — ED NOTES
ED to Inpatient Handoff Report    Notified Tianna that electronic handoff available and patient ready for transport to room 538.    Safety Risks: None identified    Patient in Restraints: no    Constant Observer or Patient : no    Telemetry Monitoring Ordered :Yes           Order to transfer to unit without monitor:N/A    Last MEWS:   Time completed: 1931    Deterioration Index Score:   Predictive Model Details          23 (Normal)  Factor Value    Calculated 3/6/2025 19:33 51% Age 65 years old    Deterioration Index Model 16% WBC count abnormal (15.0 k/uL)     11% Respiratory rate 18     9% Systolic 146     5% Pulse oximetry 93 %     4% Pulse 93     4% Sodium 134 mmol/L     1% Hematocrit 49.4 %     1% Potassium 4.0 mmol/L     0% Temperature 98.1 °F (36.7 °C)        Vitals:    03/06/25 1407 03/06/25 1408 03/06/25 1602 03/06/25 1931   BP: (!) 208/109  (!) 163/94 (!) 146/80   Pulse: (!) 115  (!) 102 93   Resp: 18  18 18   Temp: 97.8 °F (36.6 °C)   98.1 °F (36.7 °C)   TempSrc:    Oral   SpO2: 100%  93% 93%   Weight:  113.4 kg (250 lb)     Height:  1.854 m (6' 1\")           Opportunity for questions and clarification was provided.

## 2025-03-08 LAB
ANION GAP SERPL CALCULATED.3IONS-SCNC: 10 MMOL/L (ref 7–16)
BASOPHILS # BLD: 0.05 K/UL (ref 0–0.2)
BASOPHILS NFR BLD: 1 % (ref 0–2)
BUN SERPL-MCNC: 17 MG/DL (ref 6–23)
CALCIUM SERPL-MCNC: 8.9 MG/DL (ref 8.6–10.2)
CHLORIDE SERPL-SCNC: 104 MMOL/L (ref 98–107)
CO2 SERPL-SCNC: 23 MMOL/L (ref 22–29)
CREAT SERPL-MCNC: 1.3 MG/DL (ref 0.7–1.2)
ECHO AO ASC DIAM: 3.1 CM
ECHO AO ASCENDING AORTA INDEX: 1.31 CM/M2
ECHO AV AREA PEAK VELOCITY: 2.3 CM2
ECHO AV AREA VTI: 2.9 CM2
ECHO AV AREA/BSA PEAK VELOCITY: 1 CM2/M2
ECHO AV AREA/BSA VTI: 1.2 CM2/M2
ECHO AV CUSP MM: 1.8 CM
ECHO AV MEAN GRADIENT: 4 MMHG
ECHO AV MEAN VELOCITY: 1 M/S
ECHO AV PEAK GRADIENT: 9 MMHG
ECHO AV PEAK VELOCITY: 1.5 M/S
ECHO AV VELOCITY RATIO: 0.6
ECHO AV VTI: 27.7 CM
ECHO BSA: 2.43 M2
ECHO LA DIAMETER INDEX: 1.81 CM/M2
ECHO LA DIAMETER: 4.3 CM
ECHO LA VOL A-L A2C: 45 ML (ref 18–58)
ECHO LA VOL A-L A4C: 60 ML (ref 18–58)
ECHO LA VOL MOD A2C: 44 ML (ref 18–58)
ECHO LA VOL MOD A4C: 56 ML (ref 18–58)
ECHO LA VOLUME AREA LENGTH: 55 ML
ECHO LA VOLUME INDEX A-L A2C: 19 ML/M2 (ref 16–34)
ECHO LA VOLUME INDEX A-L A4C: 25 ML/M2 (ref 16–34)
ECHO LA VOLUME INDEX AREA LENGTH: 23 ML/M2 (ref 16–34)
ECHO LA VOLUME INDEX MOD A2C: 19 ML/M2 (ref 16–34)
ECHO LA VOLUME INDEX MOD A4C: 24 ML/M2 (ref 16–34)
ECHO LV E' LATERAL VELOCITY: 10 CM/S
ECHO LV E' SEPTAL VELOCITY: 6 CM/S
ECHO LV EJECTION FRACTION 3D: 60 %
ECHO LV FRACTIONAL SHORTENING: 42 % (ref 28–44)
ECHO LV INTERNAL DIMENSION DIASTOLE INDEX: 2.11 CM/M2
ECHO LV INTERNAL DIMENSION DIASTOLIC: 5 CM (ref 4.2–5.9)
ECHO LV INTERNAL DIMENSION SYSTOLIC INDEX: 1.22 CM/M2
ECHO LV INTERNAL DIMENSION SYSTOLIC: 2.9 CM
ECHO LV ISOVOLUMETRIC RELAXATION TIME (IVRT): 69.2 MS
ECHO LV IVSD: 1.1 CM (ref 0.6–1)
ECHO LV IVSS: 1.5 CM
ECHO LV MASS 2D: 207.1 G (ref 88–224)
ECHO LV MASS INDEX 2D: 87.4 G/M2 (ref 49–115)
ECHO LV POSTERIOR WALL DIASTOLIC: 1.1 CM (ref 0.6–1)
ECHO LV POSTERIOR WALL SYSTOLIC: 1.4 CM
ECHO LV RELATIVE WALL THICKNESS RATIO: 0.44
ECHO LVOT AREA: 3.8 CM2
ECHO LVOT AV VTI INDEX: 0.74
ECHO LVOT DIAM: 2.2 CM
ECHO LVOT MEAN GRADIENT: 2 MMHG
ECHO LVOT PEAK GRADIENT: 3 MMHG
ECHO LVOT PEAK VELOCITY: 0.9 M/S
ECHO LVOT STROKE VOLUME INDEX: 33 ML/M2
ECHO LVOT SV: 78.3 ML
ECHO LVOT VTI: 20.6 CM
ECHO MV "A" WAVE DURATION: 138.4 MSEC
ECHO MV A VELOCITY: 0.98 M/S
ECHO MV AREA PHT: 3.7 CM2
ECHO MV AREA VTI: 3.7 CM2
ECHO MV E DECELERATION TIME (DT): 206.1 MS
ECHO MV E VELOCITY: 0.69 M/S
ECHO MV E/A RATIO: 0.7
ECHO MV E/E' LATERAL: 6.9
ECHO MV E/E' RATIO (AVERAGED): 9.2
ECHO MV E/E' SEPTAL: 11.5
ECHO MV LVOT VTI INDEX: 1.03
ECHO MV MAX VELOCITY: 1.1 M/S
ECHO MV MEAN GRADIENT: 2 MMHG
ECHO MV MEAN VELOCITY: 0.7 M/S
ECHO MV PEAK GRADIENT: 5 MMHG
ECHO MV PRESSURE HALF TIME (PHT): 59.5 MS
ECHO MV VTI: 21.3 CM
ECHO PV MAX VELOCITY: 1 M/S
ECHO PV MEAN GRADIENT: 2 MMHG
ECHO PV MEAN VELOCITY: 0.7 M/S
ECHO PV PEAK GRADIENT: 4 MMHG
ECHO PV VTI: 18.9 CM
ECHO PVEIN A DURATION: 120 MS
ECHO PVEIN A VELOCITY: 0.3 M/S
ECHO PVEIN PEAK D VELOCITY: 0.4 M/S
ECHO PVEIN PEAK S VELOCITY: 0.5 M/S
ECHO PVEIN S/D RATIO: 1.3
ECHO RV INTERNAL DIMENSION: 3.3 CM
ECHO RV TAPSE: 2.3 CM (ref 1.7–?)
EOSINOPHIL # BLD: 0.56 K/UL (ref 0.05–0.5)
EOSINOPHILS RELATIVE PERCENT: 6 % (ref 0–6)
ERYTHROCYTE [DISTWIDTH] IN BLOOD BY AUTOMATED COUNT: 11.9 % (ref 11.5–15)
GFR, ESTIMATED: 59 ML/MIN/1.73M2
GLUCOSE SERPL-MCNC: 182 MG/DL (ref 74–99)
HCT VFR BLD AUTO: 44.8 % (ref 37–54)
HGB BLD-MCNC: 14.5 G/DL (ref 12.5–16.5)
IMM GRANULOCYTES # BLD AUTO: 0.07 K/UL (ref 0–0.58)
IMM GRANULOCYTES NFR BLD: 1 % (ref 0–5)
LYMPHOCYTES NFR BLD: 1.89 K/UL (ref 1.5–4)
LYMPHOCYTES RELATIVE PERCENT: 20 % (ref 20–42)
MCH RBC QN AUTO: 31.3 PG (ref 26–35)
MCHC RBC AUTO-ENTMCNC: 32.4 G/DL (ref 32–34.5)
MCV RBC AUTO: 96.6 FL (ref 80–99.9)
MONOCYTES NFR BLD: 0.77 K/UL (ref 0.1–0.95)
MONOCYTES NFR BLD: 8 % (ref 2–12)
NEUTROPHILS NFR BLD: 65 % (ref 43–80)
NEUTS SEG NFR BLD: 6.11 K/UL (ref 1.8–7.3)
PLATELET # BLD AUTO: 308 K/UL (ref 130–450)
PMV BLD AUTO: 9.5 FL (ref 7–12)
POTASSIUM SERPL-SCNC: 4.1 MMOL/L (ref 3.5–5)
RBC # BLD AUTO: 4.64 M/UL (ref 3.8–5.8)
SODIUM SERPL-SCNC: 137 MMOL/L (ref 132–146)
WBC OTHER # BLD: 9.5 K/UL (ref 4.5–11.5)

## 2025-03-08 PROCEDURE — 6370000000 HC RX 637 (ALT 250 FOR IP): Performed by: STUDENT IN AN ORGANIZED HEALTH CARE EDUCATION/TRAINING PROGRAM

## 2025-03-08 PROCEDURE — 93306 TTE W/DOPPLER COMPLETE: CPT | Performed by: INTERNAL MEDICINE

## 2025-03-08 PROCEDURE — 6360000002 HC RX W HCPCS: Performed by: STUDENT IN AN ORGANIZED HEALTH CARE EDUCATION/TRAINING PROGRAM

## 2025-03-08 PROCEDURE — 85025 COMPLETE CBC W/AUTO DIFF WBC: CPT

## 2025-03-08 PROCEDURE — 1200000000 HC SEMI PRIVATE

## 2025-03-08 PROCEDURE — 2500000003 HC RX 250 WO HCPCS: Performed by: STUDENT IN AN ORGANIZED HEALTH CARE EDUCATION/TRAINING PROGRAM

## 2025-03-08 PROCEDURE — 80048 BASIC METABOLIC PNL TOTAL CA: CPT

## 2025-03-08 PROCEDURE — 99232 SBSQ HOSP IP/OBS MODERATE 35: CPT | Performed by: STUDENT IN AN ORGANIZED HEALTH CARE EDUCATION/TRAINING PROGRAM

## 2025-03-08 RX ORDER — AMLODIPINE BESYLATE 5 MG/1
5 TABLET ORAL DAILY
Qty: 30 TABLET | Refills: 3 | Status: CANCELLED | OUTPATIENT
Start: 2025-03-09

## 2025-03-08 RX ORDER — HYDRALAZINE HYDROCHLORIDE 25 MG/1
25 TABLET, FILM COATED ORAL EVERY 8 HOURS SCHEDULED
Status: DISCONTINUED | OUTPATIENT
Start: 2025-03-08 | End: 2025-03-08

## 2025-03-08 RX ADMIN — APIXABAN 10 MG: 5 TABLET, FILM COATED ORAL at 21:05

## 2025-03-08 RX ADMIN — AMLODIPINE BESYLATE 5 MG: 5 TABLET ORAL at 08:48

## 2025-03-08 RX ADMIN — SODIUM CHLORIDE, PRESERVATIVE FREE 10 ML: 5 INJECTION INTRAVENOUS at 08:48

## 2025-03-08 RX ADMIN — SODIUM CHLORIDE, PRESERVATIVE FREE 10 ML: 5 INJECTION INTRAVENOUS at 21:06

## 2025-03-08 RX ADMIN — HYDRALAZINE HYDROCHLORIDE 25 MG: 25 TABLET ORAL at 12:44

## 2025-03-08 RX ADMIN — ENOXAPARIN SODIUM 120 MG: 150 INJECTION SUBCUTANEOUS at 08:48

## 2025-03-08 ASSESSMENT — PAIN DESCRIPTION - FREQUENCY: FREQUENCY: INTERMITTENT

## 2025-03-08 ASSESSMENT — PAIN DESCRIPTION - DESCRIPTORS: DESCRIPTORS: DISCOMFORT

## 2025-03-08 ASSESSMENT — PAIN - FUNCTIONAL ASSESSMENT: PAIN_FUNCTIONAL_ASSESSMENT: ACTIVITIES ARE NOT PREVENTED

## 2025-03-08 ASSESSMENT — PAIN SCALES - GENERAL: PAINLEVEL_OUTOF10: 3

## 2025-03-08 ASSESSMENT — PAIN DESCRIPTION - PAIN TYPE: TYPE: ACUTE PAIN

## 2025-03-08 ASSESSMENT — PAIN DESCRIPTION - LOCATION: LOCATION: HEAD

## 2025-03-08 ASSESSMENT — PAIN DESCRIPTION - ONSET: ONSET: GRADUAL

## 2025-03-08 NOTE — DISCHARGE SUMMARY
and/or kVp according to patient size and/or exam, and an iterative reconstruction algorithm. COMPARISON: 06/10/2021. HISTORY: ORDERING SYSTEM PROVIDED HISTORY: eval for PE TECHNOLOGIST PROVIDED HISTORY: Reason for exam:->eval for PE Additional Contrast?->1 FINDINGS: There is no evidence of pneumothorax.  There is a small left pleural effusion with bilateral lower lobe airspace disease.  Tracheobronchial tree is fairly well preserved.  There is no evidence of noncalcified pulmonary nodule. There is no evidence of thoracic aortic aneurysm.  No enlarged axillary, supraclavicular, mediastinal or hilar lymph nodes are identified.  There is no evidence of pericardial effusion.  The adrenal glands are not enlarged. Prominent splenule is not significantly changed.  Bone windows reveal no evidence of acute fracture or destructive bony lesion.  There is flowing calcification of the anterior longitudinal ligament suggesting DISH, which is not significantly changed. There is extensive low-attenuation embolism within the distal portion of the right main pulmonary artery extending into the lower lobe pulmonary artery and multiple branches, with pulmonary arterial emboli also noted extending into multiple right upper lobe pulmonary arterial branches.  Low attenuation embolism is noted within the left lower lobe pulmonary artery extending into posterior and posteromedial segments.  There is no evidence of saddle embolism. There is no evidence of right heart strain.     Extensive right lower lobe, right upper lobe and left lower lobe pulmonary arterial embolization. Small left pleural effusion with left lower lobe airspace disease which may be atelectatic tact/pneumonia or pulmonary infarct. Critical results were called by Dr. Sukhi Savage to Dr. Rafael Liu on 3/6/2025 at 16:16.     XR CHEST (2 VW)  Result Date: 3/6/2025  EXAMINATION: TWO XRAY VIEWS OF THE CHEST 3/6/2025 2:55 pm COMPARISON: None. HISTORY: ORDERING SYSTEM

## 2025-03-08 NOTE — PROGRESS NOTES
[facility] Hospitalist Progress Note    Admitting Date and Time: 3/6/2025  3:13 PM  Admit Dx: PTE (pulmonary thromboembolism) (HCC) [I26.99]  Pulmonary embolism on right (HCC) [I26.99]    Subjective:  Patient is being followed for PTE (pulmonary thromboembolism) (HCC) [I26.99]  Pulmonary embolism on right (HCC) [I26.99]     Patient resting in bed, son is bedside. He continues to have pleuritic chest pain but notes significant improvement. Shortness of breath has resolved, reports nonproductive cough. No other concerns or complaints at this time.     ROS: Pertinent findings stated above. Denies dizziness, diaphoresis, fever, chills, palpitations, abdominal pain, nausea, vomiting, dysuria, hematuria, melena, hematochezia, diarrhea, or constipation     enoxaparin  1 mg/kg SubCUTAneous BID    sodium chloride flush  5-40 mL IntraVENous 2 times per day    amLODIPine  5 mg Oral Daily     sulfur hexafluoride microspheres, 2 mL, ONCE PRN  sodium chloride flush, 5-40 mL, PRN  sodium chloride, , PRN  potassium chloride, 40 mEq, PRN   Or  potassium alternative oral replacement, 40 mEq, PRN   Or  potassium chloride, 10 mEq, PRN  magnesium sulfate, 2,000 mg, PRN  ondansetron, 4 mg, Q8H PRN   Or  ondansetron, 4 mg, Q6H PRN  polyethylene glycol, 17 g, Daily PRN  acetaminophen, 650 mg, Q6H PRN   Or  acetaminophen, 650 mg, Q6H PRN  melatonin, 3 mg, Nightly PRN  ketorolac, 15 mg, Q6H PRN  hydrALAZINE, 10 mg, Q4H PRN  fluticasone, 2 spray, Daily PRN         Objective:    BP (!) 167/87   Pulse 95   Temp 99.3 °F (37.4 °C) (Oral)   Resp 16   Ht 1.854 m (6' 1\")   Wt 113.7 kg (250 lb 9.6 oz)   SpO2 90%   BMI 33.06 kg/m²     General Appearance: No acute distress. Alert and oriented to person, place and time   HEENT: normocephalic and atraumatic, extraocular movement intact, conjunctiva clear, oral mucosa moist  Pulmonary/Chest: +cough, Diminished breath sounds in B/L LL. clear to auscultation bilaterally- no wheezes, rales, 
4 Eyes Skin Assessment     NAME:  Brian S Koeppen  YOB: 1960  MEDICAL RECORD NUMBER:  [id number]    The patient is being assessed for  Admission    I agree that at least one RN has performed a thorough Head to Toe Skin Assessment on the patient. ALL assessment sites listed below have been assessed.      Areas assessed by both nurses:    Head, Face, Ears, Shoulders, Back, Chest, Arms, Elbows, Hands, Sacrum. Buttock, Coccyx, Ischium, and Legs. Feet and Heels        Does the Patient have a Wound? No noted wound(s)       Brenden Prevention initiated by RN: Yes  Wound Care Orders initiated by RN: No    Pressure Injury (Stage 3,4, Unstageable, DTI, NWPT, and Complex wounds) if present, place Wound referral order by RN under : No    New Ostomies, if present place, Ostomy referral order under : No     Nurse 1 eSignature: Electronically signed by Marjan Castellanos RN on 3/7/25 at 1:17 AM EST    **SHARE this note so that the co-signing nurse can place an eSignature**    Nurse 2 eSignature: Electronically signed by Tianna Quinonez RN on 3/7/25 at 1:18 AM EST   
Consult received however patient is known to Dr. Andres Hughes. Nursing communication order placed and consultation switched. Please contact [facility] for further heme/ onc needs. Thank you.    Mima KNOX- CNP  [facility]    
Database initiated pharmacy and medications verified with the patient. He is A&O independent  from home with son. Admits to being noncompliant with his Eliquis.  
New consult made to Dr Andres Hughes  
Patient reporting feeling \"off\" (nausea, lightheaded) since taking PO Hydralazine this afternoon. States he has had similar feeling when changing BP meds in the past. Attending Dr. VIOLETTA Bo notified of patient report at this time.  OK to hold Hydralazine for now  
Spiritual Health History and Assessment/Progress Note  [facility]    Attempted Encounter,  ,  ,      Name: Brian S Koeppen MRN: [id number]    Age: 65 y.o.     Sex: male   Language: English   Muslim: Anabaptist   Pulmonary embolism on right (HCC)     Date: 3/8/2025                           Spiritual Assessment began in [facility] MED SURG/TELE        Referral/Consult From: Rounding   Encounter Overview/Reason: Attempted Encounter  Service Provided For: Patient not available    Elvira, Belief, Meaning:   Patient unable to assess at this time  Family/Friends No family/friends present      Importance and Influence:  Patient unable to assess at this time  Family/Friends No family/friends present    Community:  Patient Unable to assess.  Patient was unavailable.  prayed a silent prayer for the patient and left devotional material and information about  services.  Family/Friends No family/friends present    Assessment and Plan of Care:     Patient Interventions include: Unable to assess.  Patient was unavailable.  prayed a silent prayer for the patient and left devotional material and information about  services.  Family/Friends Interventions include: No family/friends present    Patient Plan of Care: Spiritual Care available upon further referral  Family/Friends Plan of Care: No family/friends present    Electronically signed by Chaplain Carlene on 3/8/2025 at 2:02 PM   
12.7* 9.5   HGB 15.9 15.2 14.5   HCT 49.4 47.9 44.8    270 308       Recent Labs     03/06/25  1434 03/07/25  0632 03/08/25  1055    138 137   K 4.0 4.8 4.1    102 104   CO2 21* 23 23   BUN 12 17 17   CREATININE 1.2 1.5* 1.3*   CALCIUM 9.4 9.5 8.9       No results for input(s): \"ALKPHOS\", \"ALT\", \"AST\", \"BILITOT\", \"AMYLASE\", \"LIPASE\" in the last 72 hours.    Invalid input(s): \"PROT\", \"ALB\"    No results for input(s): \"INR\" in the last 72 hours.    No results for input(s): \"CKTOTAL\", \"TROPONINI\" in the last 72 hours.    Chronic labs:    Lab Results   Component Value Date    CHOL 126 06/11/2021    TRIG 72 06/11/2021    HDL 35 06/11/2021    TSH 0.936 06/11/2021    INR 1.5 06/16/2021    LABA1C 5.4 06/11/2021       Radiology: REVIEWED DAILY    +++++++++++++++++++++++++++++++++++++++++++++++++  Romy Bo MD  [facility]- [place], [place]  +++++++++++++++++++++++++++++++++++++++++++++++++  NOTE: This report was transcribed using voice recognition software. Every effort was made to ensure accuracy; however, inadvertent computerized transcription errors may be present.

## 2025-03-09 VITALS
RESPIRATION RATE: 18 BRPM | BODY MASS INDEX: 33.27 KG/M2 | SYSTOLIC BLOOD PRESSURE: 164 MMHG | WEIGHT: 251 LBS | HEIGHT: 73 IN | HEART RATE: 96 BPM | TEMPERATURE: 98.1 F | DIASTOLIC BLOOD PRESSURE: 86 MMHG | OXYGEN SATURATION: 94 %

## 2025-03-09 LAB
ANION GAP SERPL CALCULATED.3IONS-SCNC: 13 MMOL/L (ref 7–16)
BASOPHILS # BLD: 0.08 K/UL (ref 0–0.2)
BASOPHILS NFR BLD: 1 % (ref 0–2)
BUN SERPL-MCNC: 15 MG/DL (ref 6–23)
CALCIUM SERPL-MCNC: 9.1 MG/DL (ref 8.6–10.2)
CHLORIDE SERPL-SCNC: 103 MMOL/L (ref 98–107)
CO2 SERPL-SCNC: 21 MMOL/L (ref 22–29)
CREAT SERPL-MCNC: 1.4 MG/DL (ref 0.7–1.2)
EOSINOPHIL # BLD: 0.66 K/UL (ref 0.05–0.5)
EOSINOPHILS RELATIVE PERCENT: 7 % (ref 0–6)
ERYTHROCYTE [DISTWIDTH] IN BLOOD BY AUTOMATED COUNT: 11.8 % (ref 11.5–15)
GFR, ESTIMATED: 58 ML/MIN/1.73M2
GLUCOSE SERPL-MCNC: 94 MG/DL (ref 74–99)
HCT VFR BLD AUTO: 48.1 % (ref 37–54)
HGB BLD-MCNC: 15.5 G/DL (ref 12.5–16.5)
IMM GRANULOCYTES # BLD AUTO: 0.07 K/UL (ref 0–0.58)
IMM GRANULOCYTES NFR BLD: 1 % (ref 0–5)
LYMPHOCYTES NFR BLD: 2.48 K/UL (ref 1.5–4)
LYMPHOCYTES RELATIVE PERCENT: 25 % (ref 20–42)
MCH RBC QN AUTO: 31.5 PG (ref 26–35)
MCHC RBC AUTO-ENTMCNC: 32.2 G/DL (ref 32–34.5)
MCV RBC AUTO: 97.8 FL (ref 80–99.9)
MONOCYTES NFR BLD: 1 K/UL (ref 0.1–0.95)
MONOCYTES NFR BLD: 10 % (ref 2–12)
NEUTROPHILS NFR BLD: 58 % (ref 43–80)
NEUTS SEG NFR BLD: 5.79 K/UL (ref 1.8–7.3)
PLATELET # BLD AUTO: 311 K/UL (ref 130–450)
PMV BLD AUTO: 9.2 FL (ref 7–12)
POTASSIUM SERPL-SCNC: 3.9 MMOL/L (ref 3.5–5)
RBC # BLD AUTO: 4.92 M/UL (ref 3.8–5.8)
SODIUM SERPL-SCNC: 137 MMOL/L (ref 132–146)
WBC OTHER # BLD: 10.1 K/UL (ref 4.5–11.5)

## 2025-03-09 PROCEDURE — 6370000000 HC RX 637 (ALT 250 FOR IP): Performed by: STUDENT IN AN ORGANIZED HEALTH CARE EDUCATION/TRAINING PROGRAM

## 2025-03-09 PROCEDURE — 80048 BASIC METABOLIC PNL TOTAL CA: CPT

## 2025-03-09 PROCEDURE — 2500000003 HC RX 250 WO HCPCS: Performed by: STUDENT IN AN ORGANIZED HEALTH CARE EDUCATION/TRAINING PROGRAM

## 2025-03-09 PROCEDURE — 36415 COLL VENOUS BLD VENIPUNCTURE: CPT

## 2025-03-09 PROCEDURE — 85025 COMPLETE CBC W/AUTO DIFF WBC: CPT

## 2025-03-09 RX ORDER — PANTOPRAZOLE SODIUM 40 MG/1
40 TABLET, DELAYED RELEASE ORAL
Status: DISCONTINUED | OUTPATIENT
Start: 2025-03-09 | End: 2025-03-09 | Stop reason: HOSPADM

## 2025-03-09 RX ORDER — PANTOPRAZOLE SODIUM 40 MG/1
40 TABLET, DELAYED RELEASE ORAL
Qty: 30 TABLET | Refills: 3 | Status: SHIPPED | OUTPATIENT
Start: 2025-03-10

## 2025-03-09 RX ORDER — AMLODIPINE BESYLATE 10 MG/1
10 TABLET ORAL DAILY
Status: DISCONTINUED | OUTPATIENT
Start: 2025-03-09 | End: 2025-03-09 | Stop reason: HOSPADM

## 2025-03-09 RX ORDER — AMLODIPINE BESYLATE 10 MG/1
10 TABLET ORAL DAILY
Qty: 30 TABLET | Refills: 3 | Status: SHIPPED | OUTPATIENT
Start: 2025-03-10

## 2025-03-09 RX ADMIN — PANTOPRAZOLE SODIUM 40 MG: 40 TABLET, DELAYED RELEASE ORAL at 09:46

## 2025-03-09 RX ADMIN — AMLODIPINE BESYLATE 10 MG: 10 TABLET ORAL at 09:46

## 2025-03-09 RX ADMIN — APIXABAN 10 MG: 5 TABLET, FILM COATED ORAL at 09:46

## 2025-03-09 RX ADMIN — SODIUM CHLORIDE, PRESERVATIVE FREE 10 ML: 5 INJECTION INTRAVENOUS at 09:46

## 2025-03-09 ASSESSMENT — PAIN SCALES - GENERAL: PAINLEVEL_OUTOF10: 0

## 2025-03-09 NOTE — CONSULTS
[facility]  [facility] 5SB MED SURG/TELE  [address] [address]  Dept: [phone]  Loc: [phone]  Attending Consult Note      Reason for Visit:   Acute pulmonary emboli, appears unprovoked. Hx of PE during COVID-19 in 2021     Referring Physician:  Mima Rosenbaum APRN - CNP     PCP:  No primary care provider on file.    History of Present Illness:     Mr. Montes, 65 year old male,  PMH of HTN, diverticulitis and former smoker (1ppd x10 years) presented as new patient to follow upon his b/l PE, that was initially presented to ER 06/10/2021 for SOB with right sided thoracic pain.     He was properly treated as inpatient, transitioned to Eliquis one day before discharge, then continued 10 mg BID for 7 days total and then to 5 mg BID. Pt was discharged on 6/17/2021. With regards to the b/l PE, likely 2/2 to Lt LE DVT, likely unprovoked, pt denied immobility, such as long distant car drive, taking flight, etc  CTA chest with prominate pulmonary emboli bilaterally, more notably in the lower lobes.  US lower extremities: DVT in left proximal and mid superficial femoral   -Hypercoag work up ordered for unprovoked bilateral PE              Thrombophilia panel:                          -MTHFR 1298 heterozygote; normal and                   MTHFR 1298 alleles present                           -Factor XIII V34L heterozygote, normal and              V34L alleles present              Protein C: 80   (%)              Protein S: 143 (%)              Antithrombin III: 81 (%), slightly depressed, maybe due to acute VTE episode              Homocysteine: 9.8 (0-15)              DRVVT: weak positive, but repeat DRVVT negative               JAK2 -ve   -2d-Echo without RV strain   -MRI abdominal showing large splenule      The patient was seen in the office in August 2021, at that time he was recommended to continue Eliquis, he was lost for follow-up, he states that he stopped Eliquis 2

## 2025-03-09 NOTE — PLAN OF CARE
Problem: ABCDS Injury Assessment  Goal: Absence of physical injury  3/9/2025 1033 by Sabrina Walker RN  Outcome: Progressing  3/9/2025 0815 by Tianna Quinonez RN  Outcome: Progressing     Problem: Discharge Planning  Goal: Discharge to home or other facility with appropriate resources  3/9/2025 1033 by Sabrina Walker RN  Outcome: Progressing  3/9/2025 0815 by Tianna Quinonez RN  Outcome: Progressing     Problem: Safety - Adult  Goal: Free from fall injury  3/9/2025 1033 by Sabrina Walker RN  Outcome: Progressing  3/9/2025 0815 by Tianna Quinonez RN  Outcome: Progressing     Problem: Pain  Goal: Verbalizes/displays adequate comfort level or baseline comfort level  3/9/2025 1033 by Sabrina Walker RN  Outcome: Progressing  3/9/2025 0815 by Tianna Quinonez RN  Outcome: Progressing

## 2025-03-31 ENCOUNTER — OFFICE VISIT (OUTPATIENT)
Dept: FAMILY MEDICINE CLINIC | Age: 65
End: 2025-03-31
Payer: MEDICARE

## 2025-03-31 VITALS
RESPIRATION RATE: 18 BRPM | DIASTOLIC BLOOD PRESSURE: 68 MMHG | TEMPERATURE: 98.4 F | OXYGEN SATURATION: 96 % | HEART RATE: 101 BPM | BODY MASS INDEX: 34.06 KG/M2 | WEIGHT: 257 LBS | SYSTOLIC BLOOD PRESSURE: 134 MMHG | HEIGHT: 73 IN

## 2025-03-31 DIAGNOSIS — I26.99 PULMONARY EMBOLISM ON RIGHT (HCC): ICD-10-CM

## 2025-03-31 DIAGNOSIS — L30.9 ECZEMA, UNSPECIFIED TYPE: ICD-10-CM

## 2025-03-31 DIAGNOSIS — Z00.00 ENCOUNTER FOR MEDICAL EXAMINATION TO ESTABLISH CARE: Primary | ICD-10-CM

## 2025-03-31 DIAGNOSIS — I10 PRIMARY HYPERTENSION: ICD-10-CM

## 2025-03-31 DIAGNOSIS — R73.9 HYPERGLYCEMIA: ICD-10-CM

## 2025-03-31 DIAGNOSIS — Z09 HOSPITAL DISCHARGE FOLLOW-UP: ICD-10-CM

## 2025-03-31 DIAGNOSIS — E55.9 VITAMIN D DEFICIENCY: ICD-10-CM

## 2025-03-31 DIAGNOSIS — B35.1 ONYCHOMYCOSIS: ICD-10-CM

## 2025-03-31 DIAGNOSIS — Z12.11 COLON CANCER SCREENING: ICD-10-CM

## 2025-03-31 LAB
ALBUMIN: 4.2 G/DL (ref 3.5–5.2)
ALP BLD-CCNC: 109 U/L (ref 40–129)
ALT SERPL-CCNC: 22 U/L (ref 0–40)
ANION GAP SERPL CALCULATED.3IONS-SCNC: 16 MMOL/L (ref 7–16)
AST SERPL-CCNC: 20 U/L (ref 0–39)
BASOPHILS ABSOLUTE: 0.04 K/UL (ref 0–0.2)
BASOPHILS RELATIVE PERCENT: 1 % (ref 0–2)
BILIRUB SERPL-MCNC: 0.4 MG/DL (ref 0–1.2)
BUN BLDV-MCNC: 13 MG/DL (ref 6–23)
CALCIUM SERPL-MCNC: 9.5 MG/DL (ref 8.6–10.2)
CHLORIDE BLD-SCNC: 105 MMOL/L (ref 98–107)
CHOLESTEROL, TOTAL: 252 MG/DL
CO2: 20 MMOL/L (ref 22–29)
CREAT SERPL-MCNC: 1.6 MG/DL (ref 0.7–1.2)
EOSINOPHILS ABSOLUTE: 0.23 K/UL (ref 0.05–0.5)
EOSINOPHILS RELATIVE PERCENT: 3 % (ref 0–6)
GFR, ESTIMATED: 49 ML/MIN/1.73M2
GLUCOSE BLD-MCNC: 108 MG/DL (ref 74–99)
HBA1C MFR BLD: 5.7 % (ref 4–5.6)
HCT VFR BLD CALC: 48.1 % (ref 37–54)
HDLC SERPL-MCNC: 40 MG/DL
HEMOGLOBIN: 15.8 G/DL (ref 12.5–16.5)
IMMATURE GRANULOCYTES %: 1 % (ref 0–5)
IMMATURE GRANULOCYTES ABSOLUTE: 0.04 K/UL (ref 0–0.58)
LDL CHOLESTEROL: 160 MG/DL
LYMPHOCYTES ABSOLUTE: 1.74 K/UL (ref 1.5–4)
LYMPHOCYTES RELATIVE PERCENT: 22 % (ref 20–42)
MCH RBC QN AUTO: 31.8 PG (ref 26–35)
MCHC RBC AUTO-ENTMCNC: 32.8 G/DL (ref 32–34.5)
MCV RBC AUTO: 96.8 FL (ref 80–99.9)
MONOCYTES ABSOLUTE: 0.75 K/UL (ref 0.1–0.95)
MONOCYTES RELATIVE PERCENT: 10 % (ref 2–12)
NEUTROPHILS ABSOLUTE: 5.01 K/UL (ref 1.8–7.3)
NEUTROPHILS RELATIVE PERCENT: 64 % (ref 43–80)
PDW BLD-RTO: 12.3 % (ref 11.5–15)
PLATELET # BLD: 287 K/UL (ref 130–450)
PMV BLD AUTO: 9.9 FL (ref 7–12)
POTASSIUM SERPL-SCNC: 4.6 MMOL/L (ref 3.5–5)
RBC # BLD: 4.97 M/UL (ref 3.8–5.8)
SODIUM BLD-SCNC: 141 MMOL/L (ref 132–146)
TOTAL PROTEIN: 8.1 G/DL (ref 6.4–8.3)
TRIGL SERPL-MCNC: 258 MG/DL
VITAMIN D 25-HYDROXY: <6 NG/ML (ref 30–100)
VLDLC SERPL CALC-MCNC: 52 MG/DL
WBC # BLD: 7.8 K/UL (ref 4.5–11.5)

## 2025-03-31 PROCEDURE — 1160F RVW MEDS BY RX/DR IN RCRD: CPT | Performed by: STUDENT IN AN ORGANIZED HEALTH CARE EDUCATION/TRAINING PROGRAM

## 2025-03-31 PROCEDURE — 3078F DIAST BP <80 MM HG: CPT | Performed by: STUDENT IN AN ORGANIZED HEALTH CARE EDUCATION/TRAINING PROGRAM

## 2025-03-31 PROCEDURE — 1123F ACP DISCUSS/DSCN MKR DOCD: CPT | Performed by: STUDENT IN AN ORGANIZED HEALTH CARE EDUCATION/TRAINING PROGRAM

## 2025-03-31 PROCEDURE — 99205 OFFICE O/P NEW HI 60 MIN: CPT | Performed by: STUDENT IN AN ORGANIZED HEALTH CARE EDUCATION/TRAINING PROGRAM

## 2025-03-31 PROCEDURE — 1111F DSCHRG MED/CURRENT MED MERGE: CPT | Performed by: STUDENT IN AN ORGANIZED HEALTH CARE EDUCATION/TRAINING PROGRAM

## 2025-03-31 PROCEDURE — 1159F MED LIST DOCD IN RCRD: CPT | Performed by: STUDENT IN AN ORGANIZED HEALTH CARE EDUCATION/TRAINING PROGRAM

## 2025-03-31 PROCEDURE — 3075F SYST BP GE 130 - 139MM HG: CPT | Performed by: STUDENT IN AN ORGANIZED HEALTH CARE EDUCATION/TRAINING PROGRAM

## 2025-03-31 RX ORDER — TRIAMCINOLONE ACETONIDE 0.25 MG/G
CREAM TOPICAL
Qty: 80 G | Refills: 3 | Status: SHIPPED | OUTPATIENT
Start: 2025-03-31

## 2025-03-31 ASSESSMENT — PATIENT HEALTH QUESTIONNAIRE - PHQ9
2. FEELING DOWN, DEPRESSED OR HOPELESS: NOT AT ALL
SUM OF ALL RESPONSES TO PHQ QUESTIONS 1-9: 0
SUM OF ALL RESPONSES TO PHQ QUESTIONS 1-9: 0
1. LITTLE INTEREST OR PLEASURE IN DOING THINGS: NOT AT ALL
SUM OF ALL RESPONSES TO PHQ QUESTIONS 1-9: 0
SUM OF ALL RESPONSES TO PHQ QUESTIONS 1-9: 0

## 2025-03-31 NOTE — PROGRESS NOTES
route daily 1 Bottle 3        Medications patient taking as of now reconciled against medications ordered at time of hospital discharge: Yes    A comprehensive review of systems was negative except for what was noted in the HPI.    Objective:    /68   Pulse (!) 101   Temp 98.4 °F (36.9 °C) (Infrared)   Resp 18   Ht 1.854 m (6' 1\")   Wt 116.6 kg (257 lb)   SpO2 96%   BMI 33.91 kg/m²     Physical Exam  Vitals and nursing note reviewed.   Constitutional:       General: He is not in acute distress.     Appearance: Normal appearance. He is not ill-appearing, toxic-appearing or diaphoretic.   HENT:      Head: Normocephalic and atraumatic.      Right Ear: External ear normal.      Left Ear: External ear normal.   Eyes:      Extraocular Movements: Extraocular movements intact.      Pupils: Pupils are equal, round, and reactive to light.   Cardiovascular:      Rate and Rhythm: Normal rate and regular rhythm.      Pulses: Normal pulses.      Heart sounds: Normal heart sounds.   Pulmonary:      Effort: Pulmonary effort is normal.      Breath sounds: Normal breath sounds.   Abdominal:      General: Bowel sounds are normal.      Palpations: Abdomen is soft.   Musculoskeletal:         General: Normal range of motion.      Cervical back: Normal range of motion and neck supple.   Feet:      Left foot:      Toenail Condition: Left toenails are abnormally thick. Fungal disease present.  Skin:     General: Skin is warm and dry.      Capillary Refill: Capillary refill takes less than 2 seconds.      Findings: Rash (on face) present.   Neurological:      General: No focal deficit present.      Mental Status: He is alert and oriented to person, place, and time.   Psychiatric:         Mood and Affect: Mood normal.         Behavior: Behavior normal.         Thought Content: Thought content normal.            An electronic signature was used to authenticate this note.  --Henna Lind DO

## 2025-04-01 ENCOUNTER — RESULTS FOLLOW-UP (OUTPATIENT)
Dept: FAMILY MEDICINE CLINIC | Age: 65
End: 2025-04-01

## 2025-04-01 DIAGNOSIS — E55.9 VITAMIN D DEFICIENCY: Primary | ICD-10-CM

## 2025-04-01 DIAGNOSIS — B35.1 ONYCHOMYCOSIS: ICD-10-CM

## 2025-04-01 DIAGNOSIS — R19.5 POSITIVE COLORECTAL CANCER SCREENING USING COLOGUARD TEST: ICD-10-CM

## 2025-04-01 DIAGNOSIS — N18.31 STAGE 3A CHRONIC KIDNEY DISEASE (HCC): Primary | ICD-10-CM

## 2025-04-01 RX ORDER — TERBINAFINE HYDROCHLORIDE 250 MG/1
250 TABLET ORAL DAILY
Qty: 84 TABLET | Refills: 0 | Status: SHIPPED | OUTPATIENT
Start: 2025-04-01 | End: 2025-06-24

## 2025-04-01 RX ORDER — ERGOCALCIFEROL 1.25 MG/1
50000 CAPSULE, LIQUID FILLED ORAL WEEKLY
Qty: 12 CAPSULE | Refills: 1 | Status: SHIPPED | OUTPATIENT
Start: 2025-04-01

## 2025-04-02 ENCOUNTER — TELEPHONE (OUTPATIENT)
Dept: ONCOLOGY | Age: 65
End: 2025-04-02

## 2025-04-02 NOTE — TELEPHONE ENCOUNTER
04/02/25 Received referral for hematology from PCP. Contacted Simon to schedule new patient (reestablish) appointment. Patient declined and stated that he is seeking other appointments with a provider in specialty of kidney. He declined appointment.

## 2025-04-09 ENCOUNTER — TELEPHONE (OUTPATIENT)
Dept: FAMILY MEDICINE CLINIC | Age: 65
End: 2025-04-09

## 2025-04-09 NOTE — TELEPHONE ENCOUNTER
Pt was in and took vitamin d and toe fungas pill and his feet swell off and on so he quit taking the toe fungus and it starting going away  What do u think dr huang?  I told him a nurse would get back to him

## 2025-04-10 NOTE — TELEPHONE ENCOUNTER
Patient called back. I went over doctors note and he verbalized understanding and will go get checked out.

## 2025-04-11 ENCOUNTER — APPOINTMENT (OUTPATIENT)
Dept: GENERAL RADIOLOGY | Age: 65
End: 2025-04-11
Payer: MEDICARE

## 2025-04-11 ENCOUNTER — HOSPITAL ENCOUNTER (EMERGENCY)
Age: 65
Discharge: HOME | End: 2025-04-11
Attending: EMERGENCY MEDICINE
Payer: MEDICARE

## 2025-04-11 ENCOUNTER — APPOINTMENT (OUTPATIENT)
Dept: ULTRASOUND IMAGING | Age: 65
End: 2025-04-11
Payer: MEDICARE

## 2025-04-11 ENCOUNTER — APPOINTMENT (OUTPATIENT)
Dept: CT IMAGING | Age: 65
End: 2025-04-11
Payer: MEDICARE

## 2025-04-11 VITALS
DIASTOLIC BLOOD PRESSURE: 98 MMHG | TEMPERATURE: 98.1 F | OXYGEN SATURATION: 98 % | SYSTOLIC BLOOD PRESSURE: 187 MMHG | BODY MASS INDEX: 34.06 KG/M2 | HEIGHT: 73 IN | HEART RATE: 95 BPM | RESPIRATION RATE: 16 BRPM | WEIGHT: 257 LBS

## 2025-04-11 DIAGNOSIS — H81.10 BENIGN PAROXYSMAL POSITIONAL VERTIGO, UNSPECIFIED LATERALITY: ICD-10-CM

## 2025-04-11 DIAGNOSIS — R60.9 EDEMA, UNSPECIFIED TYPE: ICD-10-CM

## 2025-04-11 DIAGNOSIS — I27.82 CHRONIC PULMONARY EMBOLISM, UNSPECIFIED PULMONARY EMBOLISM TYPE, UNSPECIFIED WHETHER ACUTE COR PULMONALE PRESENT (HCC): ICD-10-CM

## 2025-04-11 DIAGNOSIS — R42 DIZZINESS: Primary | ICD-10-CM

## 2025-04-11 LAB
ALBUMIN SERPL-MCNC: 4.3 G/DL (ref 3.5–5.2)
ALP SERPL-CCNC: 111 U/L (ref 40–129)
ALT SERPL-CCNC: 26 U/L (ref 0–40)
ANION GAP SERPL CALCULATED.3IONS-SCNC: 12 MMOL/L (ref 7–16)
AST SERPL-CCNC: 22 U/L (ref 0–39)
BASOPHILS # BLD: 0.05 K/UL (ref 0–0.2)
BASOPHILS NFR BLD: 1 % (ref 0–2)
BILIRUB SERPL-MCNC: 0.4 MG/DL (ref 0–1.2)
BNP SERPL-MCNC: 60 PG/ML (ref 0–125)
BUN SERPL-MCNC: 13 MG/DL (ref 6–23)
CALCIUM SERPL-MCNC: 9.7 MG/DL (ref 8.6–10.2)
CHLORIDE SERPL-SCNC: 102 MMOL/L (ref 98–107)
CO2 SERPL-SCNC: 25 MMOL/L (ref 22–29)
CREAT SERPL-MCNC: 1.4 MG/DL (ref 0.7–1.2)
EKG ATRIAL RATE: 78 BPM
EKG P AXIS: 30 DEGREES
EKG P-R INTERVAL: 188 MS
EKG Q-T INTERVAL: 406 MS
EKG QRS DURATION: 102 MS
EKG QTC CALCULATION (BAZETT): 462 MS
EKG R AXIS: -14 DEGREES
EKG T AXIS: 28 DEGREES
EKG VENTRICULAR RATE: 78 BPM
EOSINOPHIL # BLD: 0.15 K/UL (ref 0.05–0.5)
EOSINOPHILS RELATIVE PERCENT: 2 % (ref 0–6)
ERYTHROCYTE [DISTWIDTH] IN BLOOD BY AUTOMATED COUNT: 12.3 % (ref 11.5–15)
GFR, ESTIMATED: 58 ML/MIN/1.73M2
GLUCOSE SERPL-MCNC: 75 MG/DL (ref 74–99)
HCT VFR BLD AUTO: 48.6 % (ref 37–54)
HGB BLD-MCNC: 15.7 G/DL (ref 12.5–16.5)
IMM GRANULOCYTES # BLD AUTO: 0.03 K/UL (ref 0–0.58)
IMM GRANULOCYTES NFR BLD: 0 % (ref 0–5)
LYMPHOCYTES NFR BLD: 2.07 K/UL (ref 1.5–4)
LYMPHOCYTES RELATIVE PERCENT: 26 % (ref 20–42)
MCH RBC QN AUTO: 31.2 PG (ref 26–35)
MCHC RBC AUTO-ENTMCNC: 32.3 G/DL (ref 32–34.5)
MCV RBC AUTO: 96.4 FL (ref 80–99.9)
MONOCYTES NFR BLD: 0.69 K/UL (ref 0.1–0.95)
MONOCYTES NFR BLD: 9 % (ref 2–12)
NEUTROPHILS NFR BLD: 63 % (ref 43–80)
NEUTS SEG NFR BLD: 5.01 K/UL (ref 1.8–7.3)
PLATELET # BLD AUTO: 278 K/UL (ref 130–450)
PMV BLD AUTO: 9.5 FL (ref 7–12)
POTASSIUM SERPL-SCNC: 4.6 MMOL/L (ref 3.5–5)
PROT SERPL-MCNC: 8 G/DL (ref 6.4–8.3)
RBC # BLD AUTO: 5.04 M/UL (ref 3.8–5.8)
SODIUM SERPL-SCNC: 139 MMOL/L (ref 132–146)
TROPONIN I SERPL HS-MCNC: 7 NG/L (ref 0–11)
WBC OTHER # BLD: 8 K/UL (ref 4.5–11.5)

## 2025-04-11 PROCEDURE — 93970 EXTREMITY STUDY: CPT

## 2025-04-11 PROCEDURE — 83880 ASSAY OF NATRIURETIC PEPTIDE: CPT

## 2025-04-11 PROCEDURE — 99285 EMERGENCY DEPT VISIT HI MDM: CPT

## 2025-04-11 PROCEDURE — 70450 CT HEAD/BRAIN W/O DYE: CPT

## 2025-04-11 PROCEDURE — 84484 ASSAY OF TROPONIN QUANT: CPT

## 2025-04-11 PROCEDURE — 85025 COMPLETE CBC W/AUTO DIFF WBC: CPT

## 2025-04-11 PROCEDURE — 6360000004 HC RX CONTRAST MEDICATION: Performed by: RADIOLOGY

## 2025-04-11 PROCEDURE — 6370000000 HC RX 637 (ALT 250 FOR IP): Performed by: EMERGENCY MEDICINE

## 2025-04-11 PROCEDURE — 71275 CT ANGIOGRAPHY CHEST: CPT

## 2025-04-11 PROCEDURE — 80053 COMPREHEN METABOLIC PANEL: CPT

## 2025-04-11 PROCEDURE — 93005 ELECTROCARDIOGRAM TRACING: CPT | Performed by: EMERGENCY MEDICINE

## 2025-04-11 PROCEDURE — 71046 X-RAY EXAM CHEST 2 VIEWS: CPT

## 2025-04-11 RX ORDER — MECLIZINE HYDROCHLORIDE 25 MG/1
25 TABLET ORAL 3 TIMES DAILY PRN
Qty: 30 TABLET | Refills: 0 | Status: SHIPPED | OUTPATIENT
Start: 2025-04-11 | End: 2025-04-21

## 2025-04-11 RX ORDER — MECLIZINE HCL 12.5 MG 12.5 MG/1
25 TABLET ORAL ONCE
Status: COMPLETED | OUTPATIENT
Start: 2025-04-11 | End: 2025-04-11

## 2025-04-11 RX ORDER — IOPAMIDOL 755 MG/ML
75 INJECTION, SOLUTION INTRAVASCULAR
Status: COMPLETED | OUTPATIENT
Start: 2025-04-11 | End: 2025-04-11

## 2025-04-11 RX ADMIN — MECLIZINE 25 MG: 12.5 TABLET ORAL at 16:54

## 2025-04-11 RX ADMIN — IOPAMIDOL 75 ML: 755 INJECTION, SOLUTION INTRAVENOUS at 18:14

## 2025-04-11 ASSESSMENT — ENCOUNTER SYMPTOMS
EYE REDNESS: 0
VOMITING: 0
SHORTNESS OF BREATH: 0
ABDOMINAL PAIN: 0
NAUSEA: 0

## 2025-04-11 ASSESSMENT — PAIN - FUNCTIONAL ASSESSMENT: PAIN_FUNCTIONAL_ASSESSMENT: 0-10

## 2025-04-11 ASSESSMENT — PAIN SCALES - GENERAL: PAINLEVEL_OUTOF10: 0

## 2025-04-11 ASSESSMENT — LIFESTYLE VARIABLES
HOW OFTEN DO YOU HAVE A DRINK CONTAINING ALCOHOL: NEVER
HOW MANY STANDARD DRINKS CONTAINING ALCOHOL DO YOU HAVE ON A TYPICAL DAY: PATIENT DOES NOT DRINK

## 2025-04-12 NOTE — ED PROVIDER NOTES
[facility] EMERGENCY DEPARTMENT  EMERGENCY DEPARTMENT ENCOUNTER        Pt Name: Brian S Koeppen  MRN: [id number]  Birthdate 1960  Date of evaluation: 4/11/2025  Provider: Nayely Easton DO  PCP: Henna Lind DO  Note Started: 8:12 PM EDT 4/11/25    CHIEF COMPLAINT       Chief Complaint   Patient presents with    Leg Swelling     Bilateral feet/legs    Dizziness       HISTORY OF PRESENT ILLNESS: 1 or more Elements       Brian S Koeppen is a 65 y.o. male who presents to the emergency department with a 3-day history of lower extremity edema.  The patient states over the last 2 days he has had increasing edema in the bilateral lower extremities no particular pain.  He does complain of feeling mildly dizzy which she describes as feeling that things are spinning around him.  He states he rolled over in bed and began with symptoms today.  This worse with head movement he denies any fevers, chills, chest pain or shortness of breath.  The patient states that he does have a history of pulmonary embolus.  The patient is on Eliquis.  He states that secondary to his pharmacy issues he had been out of it for a few days.    Nursing Notes were all reviewed and agreed with or any disagreements were addressed in the HPI.    REVIEW OF SYSTEMS :      Review of Systems   Constitutional:  Negative for fever.   HENT:  Negative for congestion.    Eyes:  Negative for redness.   Respiratory:  Negative for shortness of breath.    Cardiovascular:  Positive for leg swelling. Negative for chest pain.   Gastrointestinal:  Negative for abdominal pain, nausea and vomiting.   Genitourinary:  Negative for dysuria.   Musculoskeletal:  Negative for arthralgias.   Skin:  Negative for rash.   Neurological:  Positive for dizziness and light-headedness.   Psychiatric/Behavioral:  Negative for confusion.    All other systems reviewed and are negative.      Positives and Pertinent negatives as per HPI.     SURGICAL  98.1 °F (36.7 °C) Oral (!) 101 18 98 %      Height Weight - Scale         1.854 m (6' 1\") 116.6 kg (257 lb)               Constitutional/General: Alert and oriented x3, well appearing, non toxic in NAD  Head: NC/AT  Eyes:  EOMI  Mouth: Oropharynx clear, handling secretions, no trismus  Neck: Supple, full ROM  Pulmonary: Lungs clear to auscultation bilaterally, no wheezes, rales, or rhonchi. Not in respiratory distress  Cardiovascular:  Regular rate and rhythm, no murmurs, gallops, or rubs. 2+ distal pulses  Abdomen: Soft, non tender, non distended,   Musculoskeletal: Moves all extremities x 4. Warm and well perfused, 1+ pitting edema the bilateral lower extremity  Skin: warm and dry without rash  Neurologic: GCS 15, no gross focal neurologic deficits  Psych: Normal Affect      NIH Stroke Scale/Score at time of initial evaluation:  1A: Level of Consciousness 0 - alert; keenly responsive   1B: Ask Month and Age 0 - answers both questions correctly   1C: Tell Patient To Open and Close Eyes, then Hand  Squeeze 0 - performs both tasks correctly   2: Test Horizontal Extraocular Movements 0 - normal   3: Test Visual Fields 0 - no visual loss   4: Test Facial Palsy 0 - normal symmetric movement   5A: Test Left Arm Motor Drift 0 - no drift, limb holds 90 (or 45) degrees for full 10 seconds   5B: Test Right Arm Motor Drift 0 - no drift, limb holds 90 (or 45) degrees for full 10 seconds   6A: Test Left Leg Motor Drift 0 - no drift; leg holds 30 degree position for full 5 seconds   6B: Test Right Leg Motor Drift 0 - no drift; leg holds 30 degree position for full 5 seconds   7: Test Limb Ataxia   (FNF/Heel-Shin) 0 - absent   8: Test Sensation 0 - normal; no sensory loss   9: Test Language/Aphasia 0 - no aphasia, normal   10: Test Dysarthria 0 - normal   11: Test Extinction/Inattention 0 - no abnormality   Total Score: 0     DIAGNOSTIC RESULTS     I have personally reviewed all laboratory and imaging results for this

## 2025-04-14 LAB
EKG ATRIAL RATE: 78 BPM
EKG P AXIS: 30 DEGREES
EKG P-R INTERVAL: 188 MS
EKG Q-T INTERVAL: 406 MS
EKG QRS DURATION: 102 MS
EKG QTC CALCULATION (BAZETT): 462 MS
EKG R AXIS: -14 DEGREES
EKG T AXIS: 28 DEGREES
EKG VENTRICULAR RATE: 78 BPM

## 2025-04-14 PROCEDURE — 93010 ELECTROCARDIOGRAM REPORT: CPT | Performed by: INTERNAL MEDICINE

## 2025-04-20 LAB — NONINV COLON CA DNA+OCC BLD SCRN STL QL: NORMAL

## 2025-05-07 LAB — NONINV COLON CA DNA+OCC BLD SCRN STL QL: POSITIVE

## 2025-05-20 ENCOUNTER — OFFICE VISIT (OUTPATIENT)
Dept: SURGERY | Age: 65
End: 2025-05-20
Payer: MEDICARE

## 2025-05-20 ENCOUNTER — PREP FOR PROCEDURE (OUTPATIENT)
Dept: SURGERY | Age: 65
End: 2025-05-20

## 2025-05-20 VITALS
SYSTOLIC BLOOD PRESSURE: 140 MMHG | DIASTOLIC BLOOD PRESSURE: 80 MMHG | HEART RATE: 108 BPM | OXYGEN SATURATION: 95 % | WEIGHT: 258 LBS | HEIGHT: 73 IN | BODY MASS INDEX: 34.19 KG/M2

## 2025-05-20 DIAGNOSIS — R19.5 POSITIVE COLORECTAL CANCER SCREENING USING COLOGUARD TEST: Primary | ICD-10-CM

## 2025-05-20 PROCEDURE — 1123F ACP DISCUSS/DSCN MKR DOCD: CPT | Performed by: SURGERY

## 2025-05-20 PROCEDURE — 99203 OFFICE O/P NEW LOW 30 MIN: CPT | Performed by: SURGERY

## 2025-05-20 PROCEDURE — 3077F SYST BP >= 140 MM HG: CPT | Performed by: SURGERY

## 2025-05-20 PROCEDURE — 3079F DIAST BP 80-89 MM HG: CPT | Performed by: SURGERY

## 2025-05-20 RX ORDER — SODIUM CHLORIDE 9 MG/ML
INJECTION, SOLUTION INTRAVENOUS PRN
OUTPATIENT
Start: 2025-05-20

## 2025-05-20 RX ORDER — SODIUM CHLORIDE 0.9 % (FLUSH) 0.9 %
5-40 SYRINGE (ML) INJECTION EVERY 12 HOURS SCHEDULED
OUTPATIENT
Start: 2025-05-20

## 2025-05-20 RX ORDER — SODIUM CHLORIDE 0.9 % (FLUSH) 0.9 %
5-40 SYRINGE (ML) INJECTION PRN
OUTPATIENT
Start: 2025-05-20

## 2025-05-20 NOTE — PROGRESS NOTES
General Surgery History and Physical    Patient's Name/Date of Birth: Brian S Koeppen / 1960    Date: 5/20/2025    PCP: Henna Lind DO    Referring Physician:   Henna Lind DO  [phone]    CHIEF COMPLAINT:    Chief Complaint   Patient presents with    New Patient     Ref by Dr Lind for positive cologuard          HISTORY OF PRESENT ILLNESS:    Brian S Koeppen is an 65 y.o. male who presents for a diagnostic colonoscopy for positive Cologuard test. Patient has never had a colonoscopy or a Cologuard test in the past. The patient denies any symptoms. No nausea, vomiting, diarrhea, constipation. No changes in stool caliber. No bloody or black stools. No abdominal pain. No unintentional weight loss. No family history of colon cancer. The patient has a known history of: no known risk factors.     Past Medical History:   Past Medical History:   Diagnosis Date    Acute pulmonary embolism (HCC) 06/10/2021    bilateral    Hypertension         Past Surgical History:   Past Surgical History:   Procedure Laterality Date    FINGER SURGERY Left     index finger        Allergies: Patient has no known allergies.     Medications:   Current Outpatient Medications   Medication Sig Dispense Refill    vitamin D (ERGOCALCIFEROL) 1.25 MG (43270 UT) CAPS capsule Take 1 capsule by mouth once a week 12 capsule 1    terbinafine (LAMISIL) 250 MG tablet Take 1 tablet by mouth daily 84 tablet 0    apixaban (ELIQUIS) 5 MG TABS tablet Take 1 tablet by mouth 2 times daily 180 tablet 1    triamcinolone (KENALOG) 0.025 % cream Apply topically 2 times daily. 80 g 3    amLODIPine (NORVASC) 10 MG tablet Take 1 tablet by mouth daily 30 tablet 3    pantoprazole (PROTONIX) 40 MG tablet Take 1 tablet by mouth every morning (before breakfast) 30 tablet 3    fluticasone (FLONASE) 50 MCG/ACT nasal spray 2 sprays by Each Nostril route daily 1 Bottle 3     No current facility-administered medications for this visit.         Social History:

## 2025-05-22 ENCOUNTER — TELEPHONE (OUTPATIENT)
Dept: SURGERY | Age: 65
End: 2025-05-22

## 2025-05-22 NOTE — TELEPHONE ENCOUNTER
Scheduled patient for diagnostic colonoscopy on 7/10/25 @ 9:15 AM at [facility], patient is to arrive at 8:15 AM. Hold ASA for 5 days prior and hold eliquis for 2 days prior . Informed patient of date and time, patient verbalized understanding. Instruction sheet given.  Electronically signed by Irasema Zamora MA on 5/22/25 at 12:05 PM EDT

## 2025-07-02 ENCOUNTER — OFFICE VISIT (OUTPATIENT)
Dept: FAMILY MEDICINE CLINIC | Age: 65
End: 2025-07-02

## 2025-07-02 VITALS
TEMPERATURE: 97.3 F | SYSTOLIC BLOOD PRESSURE: 148 MMHG | DIASTOLIC BLOOD PRESSURE: 84 MMHG | OXYGEN SATURATION: 98 % | HEIGHT: 73 IN | HEART RATE: 98 BPM | BODY MASS INDEX: 34.22 KG/M2 | WEIGHT: 258.2 LBS | RESPIRATION RATE: 18 BRPM

## 2025-07-02 DIAGNOSIS — Z00.00 INITIAL MEDICARE ANNUAL WELLNESS VISIT: Primary | ICD-10-CM

## 2025-07-02 DIAGNOSIS — E53.8 VITAMIN B12 DEFICIENCY: ICD-10-CM

## 2025-07-02 DIAGNOSIS — Z87.891 FORMER CIGARETTE SMOKER: ICD-10-CM

## 2025-07-02 DIAGNOSIS — I10 PRIMARY HYPERTENSION: ICD-10-CM

## 2025-07-02 DIAGNOSIS — D50.9 IRON DEFICIENCY ANEMIA, UNSPECIFIED IRON DEFICIENCY ANEMIA TYPE: ICD-10-CM

## 2025-07-02 DIAGNOSIS — G25.81 RESTLESS LEG SYNDROME: ICD-10-CM

## 2025-07-02 PROBLEM — I26.99 ACUTE PULMONARY EMBOLISM WITHOUT ACUTE COR PULMONALE (HCC): Status: RESOLVED | Noted: 2021-06-10 | Resolved: 2025-07-02

## 2025-07-02 RX ORDER — ROPINIROLE 1 MG/1
1 TABLET, FILM COATED ORAL NIGHTLY
Qty: 90 TABLET | Refills: 1 | Status: SHIPPED | OUTPATIENT
Start: 2025-07-02

## 2025-07-02 RX ORDER — BLOOD PRESSURE TEST KIT
1 KIT MISCELLANEOUS DAILY
Qty: 1 KIT | Refills: 0 | Status: SHIPPED | OUTPATIENT
Start: 2025-07-02

## 2025-07-02 RX ORDER — VALSARTAN 40 MG/1
40 TABLET ORAL DAILY
Qty: 30 TABLET | Refills: 3 | Status: SHIPPED | OUTPATIENT
Start: 2025-07-02

## 2025-07-02 ASSESSMENT — PATIENT HEALTH QUESTIONNAIRE - PHQ9
1. LITTLE INTEREST OR PLEASURE IN DOING THINGS: NOT AT ALL
SUM OF ALL RESPONSES TO PHQ QUESTIONS 1-9: 0
SUM OF ALL RESPONSES TO PHQ QUESTIONS 1-9: 0
2. FEELING DOWN, DEPRESSED OR HOPELESS: NOT AT ALL
SUM OF ALL RESPONSES TO PHQ QUESTIONS 1-9: 0
SUM OF ALL RESPONSES TO PHQ QUESTIONS 1-9: 0

## 2025-07-02 ASSESSMENT — LIFESTYLE VARIABLES
HOW OFTEN DURING THE LAST YEAR HAVE YOU FOUND THAT YOU WERE NOT ABLE TO STOP DRINKING ONCE YOU HAD STARTED: NEVER
HOW OFTEN DURING THE LAST YEAR HAVE YOU FAILED TO DO WHAT WAS NORMALLY EXPECTED FROM YOU BECAUSE OF DRINKING: NEVER
HOW OFTEN DURING THE LAST YEAR HAVE YOU HAD A FEELING OF GUILT OR REMORSE AFTER DRINKING: NEVER
HOW MANY STANDARD DRINKS CONTAINING ALCOHOL DO YOU HAVE ON A TYPICAL DAY: 3 OR 4
HAVE YOU OR SOMEONE ELSE BEEN INJURED AS A RESULT OF YOUR DRINKING: NO
HOW OFTEN DURING THE LAST YEAR HAVE YOU NEEDED AN ALCOHOLIC DRINK FIRST THING IN THE MORNING TO GET YOURSELF GOING AFTER A NIGHT OF HEAVY DRINKING: NEVER
HOW OFTEN DO YOU HAVE A DRINK CONTAINING ALCOHOL: 2-3 TIMES A WEEK
HOW OFTEN DURING THE LAST YEAR HAVE YOU BEEN UNABLE TO REMEMBER WHAT HAPPENED THE NIGHT BEFORE BECAUSE YOU HAD BEEN DRINKING: NEVER
HAS A RELATIVE, FRIEND, DOCTOR, OR ANOTHER HEALTH PROFESSIONAL EXPRESSED CONCERN ABOUT YOUR DRINKING OR SUGGESTED YOU CUT DOWN: NO

## 2025-07-02 NOTE — PATIENT INSTRUCTIONS
has fabric upholstery, you might find that it's hard to slide around. Try covering the seat with something to make it easier to slide on, like a piece of plastic or vinyl. Make sure it doesn't get in the way of your seat belt.  If you still have trouble, ask your physical therapist or occupational therapist to show you the best way to get in and out of a car. They can also tell you about tools that can make this easier for you.  Assistive devices for getting in and out of a car  There are a number of devices that can make getting in and out of the car easier. You can find them at medical supply or [employer] stores or online.  And if you don't already have one, think about getting a disabled parking permit. Your doctor can help you. The doctor will fill out a form that you can take to the local licensing or tax office. These permits may be permanent or temporary.  Grab bar and door strap    There are several types of handholds that can be added to the frame of your car door or beside the door. They give you something to hold on to as you get in and out of the car seat.  Swivel seat    A swivel seat is like a lazy Susan or a turntable. You sit down facing sideways and then use it to turn forward as you pull your legs in.  Seat belt extender    If you have a hard time with a normal seat belt, an extension may help you find and reach the end of the belt more easily.  Follow-up care is a key part of your treatment and safety. Be sure to make and go to all appointments, and call your doctor if you are having problems. It's also a good idea to know your test results and keep a list of the medicines you take.  Where can you learn more?  Go to https://www.[employer].net/patientEd and enter K991 to learn more about \"Learning About Getting In and Out of a Car Safely.\"  Current as of: July 31, 2024  Content Version: 14.5  © 9424-8499 [employer].   Care instructions adapted under license by [employer]. If you have

## 2025-07-02 NOTE — PROGRESS NOTES
Medicare Annual Wellness Visit    Brian S Koeppen is here for Medicare AWV    Assessment & Plan   Initial Medicare annual wellness visit  Primary hypertension  -     valsartan (DIOVAN) 40 MG tablet; Take 1 tablet by mouth daily, Disp-30 tablet, R-3Normal  -     Blood Pressure KIT; DAILY Starting Wed 7/2/2025, Disp-1 kit, R-0, Print  Former cigarette smoker  -     US SCREENING FOR AAA; Future  Restless leg syndrome  -     Iron and TIBC  -     Ferritin  -     rOPINIRole (REQUIP) 1 MG tablet; Take 1 tablet by mouth nightly, Disp-90 tablet, R-1Normal  Vitamin B12 deficiency  -     Vitamin B12  Iron deficiency anemia, unspecified iron deficiency anemia type  -     Iron and TIBC  -     Ferritin     Return in 3 months (on 10/2/2025), or if symptoms worsen or fail to improve, for Medicare Annual Wellness Visit in 1 year, HTN; 1 week BP check.     Subjective   The following acute and/or chronic problems were also addressed today:  History of Present Illness  The patient presents for evaluation of elevated blood pressure, restless legs, and health maintenance.    He discontinued amlodipine due to side effects of foot swelling and dizziness. The swelling subsided after stopping the medication. He recalls a previous instance where he was prescribed lisinopril, which caused stomach discomfort, leading to a change in his medication. He does not own a blood pressure monitor at home.    He has been taking magnesium, potassium, and B12 supplements for his toes, which seem to have improved over the past week. He also received medication for his toenails but stopped taking it due to leg swelling. He experiences restlessness at night, particularly when he is not ready to sleep. His legs feel fuzzy and electric-like, although this sensation has decreased in the past few days.    He has a colonoscopy scheduled in 8 days. He does not have a living will.     SOCIAL HISTORY  The patient uses marijuana once in a while and drinks beer

## 2025-07-03 ENCOUNTER — RESULTS FOLLOW-UP (OUTPATIENT)
Dept: FAMILY MEDICINE CLINIC | Age: 65
End: 2025-07-03

## 2025-07-03 LAB
FERRITIN: 628 NG/ML
IRON % SATURATION: 47 % (ref 20–55)
IRON: 138 UG/DL (ref 61–157)
TOTAL IRON BINDING CAPACITY: 295 UG/DL (ref 250–450)
VITAMIN B-12: 570 PG/ML (ref 232–1245)

## 2025-07-06 RX ORDER — PANTOPRAZOLE SODIUM 40 MG/1
40 TABLET, DELAYED RELEASE ORAL
Qty: 30 TABLET | Refills: 3 | Status: SHIPPED | OUTPATIENT
Start: 2025-07-06

## 2025-07-07 ENCOUNTER — TELEPHONE (OUTPATIENT)
Dept: SURGERY | Age: 65
End: 2025-07-07

## 2025-07-07 NOTE — TELEPHONE ENCOUNTER
Patient called in and asked to cancel his 7/10/25 scope at this time. He will call back to reschedule.

## 2025-07-16 ENCOUNTER — LAB (OUTPATIENT)
Dept: FAMILY MEDICINE CLINIC | Age: 65
End: 2025-07-16
Payer: MEDICARE

## 2025-07-16 VITALS — SYSTOLIC BLOOD PRESSURE: 138 MMHG | OXYGEN SATURATION: 98 % | DIASTOLIC BLOOD PRESSURE: 72 MMHG | HEART RATE: 92 BPM

## 2025-07-16 DIAGNOSIS — I10 PRIMARY HYPERTENSION: Primary | ICD-10-CM

## 2025-07-16 PROCEDURE — 99211 OFF/OP EST MAY X REQ PHY/QHP: CPT | Performed by: STUDENT IN AN ORGANIZED HEALTH CARE EDUCATION/TRAINING PROGRAM
